# Patient Record
Sex: MALE | Race: WHITE | NOT HISPANIC OR LATINO | URBAN - METROPOLITAN AREA
[De-identification: names, ages, dates, MRNs, and addresses within clinical notes are randomized per-mention and may not be internally consistent; named-entity substitution may affect disease eponyms.]

---

## 2022-04-10 ENCOUNTER — EMERGENCY (EMERGENCY)
Facility: HOSPITAL | Age: 67
LOS: 1 days | Discharge: HOME | End: 2022-04-10
Attending: EMERGENCY MEDICINE
Payer: MEDICARE

## 2022-04-10 VITALS
DIASTOLIC BLOOD PRESSURE: 100 MMHG | HEART RATE: 45 BPM | SYSTOLIC BLOOD PRESSURE: 198 MMHG | RESPIRATION RATE: 20 BRPM | WEIGHT: 210.1 LBS | OXYGEN SATURATION: 100 % | HEIGHT: 73 IN | TEMPERATURE: 98 F

## 2022-04-10 VITALS
RESPIRATION RATE: 18 BRPM | OXYGEN SATURATION: 100 % | TEMPERATURE: 98 F | DIASTOLIC BLOOD PRESSURE: 68 MMHG | HEART RATE: 47 BPM | SYSTOLIC BLOOD PRESSURE: 114 MMHG

## 2022-04-10 LAB
ALBUMIN SERPL ELPH-MCNC: 4.2 G/DL — SIGNIFICANT CHANGE UP (ref 3.3–5)
ALP SERPL-CCNC: 81 U/L — SIGNIFICANT CHANGE UP (ref 40–120)
ALT FLD-CCNC: 15 U/L — SIGNIFICANT CHANGE UP (ref 10–45)
ANION GAP SERPL CALC-SCNC: 14 MMOL/L — SIGNIFICANT CHANGE UP (ref 5–17)
APPEARANCE UR: CLEAR — SIGNIFICANT CHANGE UP
AST SERPL-CCNC: 20 U/L — SIGNIFICANT CHANGE UP (ref 10–40)
BACTERIA # UR AUTO: NEGATIVE — SIGNIFICANT CHANGE UP
BASE EXCESS BLDV CALC-SCNC: 1.5 MMOL/L — SIGNIFICANT CHANGE UP (ref -2–2)
BASOPHILS # BLD AUTO: 0.03 K/UL — SIGNIFICANT CHANGE UP (ref 0–0.2)
BASOPHILS NFR BLD AUTO: 0.4 % — SIGNIFICANT CHANGE UP (ref 0–2)
BILIRUB SERPL-MCNC: 0.5 MG/DL — SIGNIFICANT CHANGE UP (ref 0.2–1.2)
BILIRUB UR-MCNC: NEGATIVE — SIGNIFICANT CHANGE UP
BUN SERPL-MCNC: 21 MG/DL — SIGNIFICANT CHANGE UP (ref 7–23)
CALCIUM SERPL-MCNC: 9.9 MG/DL — SIGNIFICANT CHANGE UP (ref 8.4–10.5)
CHLORIDE SERPL-SCNC: 107 MMOL/L — SIGNIFICANT CHANGE UP (ref 96–108)
CO2 BLDV-SCNC: 26 MMOL/L — SIGNIFICANT CHANGE UP (ref 22–26)
CO2 SERPL-SCNC: 22 MMOL/L — SIGNIFICANT CHANGE UP (ref 22–31)
COLOR SPEC: SIGNIFICANT CHANGE UP
CREAT SERPL-MCNC: 1.17 MG/DL — SIGNIFICANT CHANGE UP (ref 0.5–1.3)
DIFF PNL FLD: ABNORMAL
EGFR: 68 ML/MIN/1.73M2 — SIGNIFICANT CHANGE UP
EOSINOPHIL # BLD AUTO: 0.11 K/UL — SIGNIFICANT CHANGE UP (ref 0–0.5)
EOSINOPHIL NFR BLD AUTO: 1.5 % — SIGNIFICANT CHANGE UP (ref 0–6)
EPI CELLS # UR: 1 /HPF — SIGNIFICANT CHANGE UP
GAS PNL BLDV: SIGNIFICANT CHANGE UP
GLUCOSE SERPL-MCNC: 129 MG/DL — HIGH (ref 70–99)
GLUCOSE UR QL: NEGATIVE — SIGNIFICANT CHANGE UP
HCO3 BLDV-SCNC: 25 MMOL/L — SIGNIFICANT CHANGE UP (ref 22–29)
HCT VFR BLD CALC: 45.3 % — SIGNIFICANT CHANGE UP (ref 39–50)
HGB BLD-MCNC: 14.9 G/DL — SIGNIFICANT CHANGE UP (ref 13–17)
HYALINE CASTS # UR AUTO: 1 /LPF — SIGNIFICANT CHANGE UP (ref 0–2)
IMM GRANULOCYTES NFR BLD AUTO: 0.3 % — SIGNIFICANT CHANGE UP (ref 0–1.5)
KETONES UR-MCNC: NEGATIVE — SIGNIFICANT CHANGE UP
LEUKOCYTE ESTERASE UR-ACNC: NEGATIVE — SIGNIFICANT CHANGE UP
LIDOCAIN IGE QN: 20 U/L — SIGNIFICANT CHANGE UP (ref 7–60)
LYMPHOCYTES # BLD AUTO: 2.02 K/UL — SIGNIFICANT CHANGE UP (ref 1–3.3)
LYMPHOCYTES # BLD AUTO: 27.1 % — SIGNIFICANT CHANGE UP (ref 13–44)
MCHC RBC-ENTMCNC: 29.7 PG — SIGNIFICANT CHANGE UP (ref 27–34)
MCHC RBC-ENTMCNC: 32.9 GM/DL — SIGNIFICANT CHANGE UP (ref 32–36)
MCV RBC AUTO: 90.4 FL — SIGNIFICANT CHANGE UP (ref 80–100)
MONOCYTES # BLD AUTO: 0.53 K/UL — SIGNIFICANT CHANGE UP (ref 0–0.9)
MONOCYTES NFR BLD AUTO: 7.1 % — SIGNIFICANT CHANGE UP (ref 2–14)
NEUTROPHILS # BLD AUTO: 4.74 K/UL — SIGNIFICANT CHANGE UP (ref 1.8–7.4)
NEUTROPHILS NFR BLD AUTO: 63.6 % — SIGNIFICANT CHANGE UP (ref 43–77)
NITRITE UR-MCNC: NEGATIVE — SIGNIFICANT CHANGE UP
NRBC # BLD: 0 /100 WBCS — SIGNIFICANT CHANGE UP (ref 0–0)
PCO2 BLDV: 34 MMHG — LOW (ref 42–55)
PH BLDV: 7.47 — HIGH (ref 7.32–7.43)
PH UR: 6.5 — SIGNIFICANT CHANGE UP (ref 5–8)
PLATELET # BLD AUTO: 223 K/UL — SIGNIFICANT CHANGE UP (ref 150–400)
PO2 BLDV: 42 MMHG — SIGNIFICANT CHANGE UP (ref 25–45)
POTASSIUM SERPL-MCNC: 4 MMOL/L — SIGNIFICANT CHANGE UP (ref 3.5–5.3)
POTASSIUM SERPL-SCNC: 4 MMOL/L — SIGNIFICANT CHANGE UP (ref 3.5–5.3)
PROT SERPL-MCNC: 7.2 G/DL — SIGNIFICANT CHANGE UP (ref 6–8.3)
PROT UR-MCNC: NEGATIVE — SIGNIFICANT CHANGE UP
RBC # BLD: 5.01 M/UL — SIGNIFICANT CHANGE UP (ref 4.2–5.8)
RBC # FLD: 14.2 % — SIGNIFICANT CHANGE UP (ref 10.3–14.5)
RBC CASTS # UR COMP ASSIST: 193 /HPF — HIGH (ref 0–4)
SAO2 % BLDV: 76.3 % — SIGNIFICANT CHANGE UP (ref 67–88)
SODIUM SERPL-SCNC: 143 MMOL/L — SIGNIFICANT CHANGE UP (ref 135–145)
SP GR SPEC: 1.02 — SIGNIFICANT CHANGE UP (ref 1.01–1.02)
UROBILINOGEN FLD QL: NEGATIVE — SIGNIFICANT CHANGE UP
WBC # BLD: 7.45 K/UL — SIGNIFICANT CHANGE UP (ref 3.8–10.5)
WBC # FLD AUTO: 7.45 K/UL — SIGNIFICANT CHANGE UP (ref 3.8–10.5)
WBC UR QL: 1 /HPF — SIGNIFICANT CHANGE UP (ref 0–5)

## 2022-04-10 PROCEDURE — G1004: CPT

## 2022-04-10 PROCEDURE — 36415 COLL VENOUS BLD VENIPUNCTURE: CPT

## 2022-04-10 PROCEDURE — 93005 ELECTROCARDIOGRAM TRACING: CPT

## 2022-04-10 PROCEDURE — 74176 CT ABD & PELVIS W/O CONTRAST: CPT | Mod: 26,MG

## 2022-04-10 PROCEDURE — 85025 COMPLETE CBC W/AUTO DIFF WBC: CPT

## 2022-04-10 PROCEDURE — 99285 EMERGENCY DEPT VISIT HI MDM: CPT

## 2022-04-10 PROCEDURE — 81001 URINALYSIS AUTO W/SCOPE: CPT

## 2022-04-10 PROCEDURE — 87086 URINE CULTURE/COLONY COUNT: CPT

## 2022-04-10 PROCEDURE — 99285 EMERGENCY DEPT VISIT HI MDM: CPT | Mod: 25

## 2022-04-10 PROCEDURE — 74176 CT ABD & PELVIS W/O CONTRAST: CPT | Mod: MG

## 2022-04-10 PROCEDURE — 82803 BLOOD GASES ANY COMBINATION: CPT

## 2022-04-10 PROCEDURE — 80053 COMPREHEN METABOLIC PANEL: CPT

## 2022-04-10 PROCEDURE — 83690 ASSAY OF LIPASE: CPT

## 2022-04-10 PROCEDURE — 93010 ELECTROCARDIOGRAM REPORT: CPT

## 2022-04-10 PROCEDURE — 71045 X-RAY EXAM CHEST 1 VIEW: CPT | Mod: 26

## 2022-04-10 PROCEDURE — 71045 X-RAY EXAM CHEST 1 VIEW: CPT

## 2022-04-10 RX ORDER — ACETAMINOPHEN 500 MG
975 TABLET ORAL ONCE
Refills: 0 | Status: COMPLETED | OUTPATIENT
Start: 2022-04-10 | End: 2022-04-10

## 2022-04-10 RX ORDER — KETOROLAC TROMETHAMINE 30 MG/ML
15 SYRINGE (ML) INJECTION ONCE
Refills: 0 | Status: DISCONTINUED | OUTPATIENT
Start: 2022-04-10 | End: 2022-04-10

## 2022-04-10 RX ORDER — TAMSULOSIN HYDROCHLORIDE 0.4 MG/1
1 CAPSULE ORAL
Qty: 7 | Refills: 0
Start: 2022-04-10 | End: 2022-04-16

## 2022-04-10 RX ORDER — OXYCODONE HYDROCHLORIDE 5 MG/1
1 TABLET ORAL
Qty: 9 | Refills: 0
Start: 2022-04-10 | End: 2022-04-12

## 2022-04-10 RX ORDER — SODIUM CHLORIDE 9 MG/ML
1000 INJECTION INTRAMUSCULAR; INTRAVENOUS; SUBCUTANEOUS ONCE
Refills: 0 | Status: COMPLETED | OUTPATIENT
Start: 2022-04-10 | End: 2022-04-10

## 2022-04-10 RX ADMIN — Medication 975 MILLIGRAM(S): at 19:59

## 2022-04-10 RX ADMIN — SODIUM CHLORIDE 1000 MILLILITER(S): 9 INJECTION INTRAMUSCULAR; INTRAVENOUS; SUBCUTANEOUS at 20:00

## 2022-04-10 RX ADMIN — Medication 15 MILLIGRAM(S): at 19:59

## 2022-04-10 NOTE — ED PROVIDER NOTE - PATIENT PORTAL LINK FT
You can access the FollowMyHealth Patient Portal offered by [facility] by registering at the following website: [email or web address]. By joining [facility]’s FollowMyHealth portal, you will also be able to view your health information using other applications (apps) compatible with our system.

## 2022-04-10 NOTE — ED PROVIDER NOTE - ATTENDING CONTRIBUTION TO CARE
67M hx of PPM, kidney stones, colorectal CA sp chemo in remission here with 2 hours of colicky LLQ pain radiating to L flank which has resolved while waiting in ED. NO fevers, +hematuria, no dysuria, no skin rashes. Exam now unremarkable, minimal LLQ and L flank ttp. No skin rashes. Heart and lungs WNL. Probably recently passed kidney stone. Labs, UA, CTAP. 67M hx of PPM, kidney stones, colorectal CA sp chemo in remission here with 2 hours of colicky LLQ pain radiating to L flank which has mostly resolved while waiting in ED. NO fevers, +hematuria, no dysuria, no skin rashes. Exam now unremarkable, minimal LLQ and L flank ttp. No skin rashes. Heart and lungs WNL. Probably recently passed kidney stone. Labs, UA, CTAP. 67M hx of PPM, kidney stones, colorectal CA sp chemo in remission here with 2 hours of colicky LLQ pain radiating to L flank which has mostly resolved while waiting in ED. NO fevers, +hematuria, no dysuria, no skin rashes, no scrotal pain or swelling. Exam now unremarkable, minimal LLQ and L flank ttp. No skin rashes. Heart and lungs WNL. Probably kidney stone. Less likley vasc or GI. Labs, UA, CTAP.

## 2022-04-10 NOTE — ED PROVIDER NOTE - NSFOLLOWUPINSTRUCTIONS_ED_ALL_ED_FT
Kidney Stones    Kidney stones (urolithiasis) are crystal deposits that form inside your kidneys. Pain is caused by the stone moving through the urinary tract, causing spasms of the ureter. Drink enough water and fluids to keep your urine clear or pale yellow. This will help you to pass the stone or stone fragments. If provided a strainer, strain all urine and keep all particulate matter and stones for a follow up appointment with a urologist.    SEEK IMMEDIATE MEDICAL CARE IF YOU HAVE ANY OF THE FOLLOWING SYMPTOMS: pain not controlled with medication, fever/chills, worsening vomiting, inability to urinate, or dizziness/lightheadedness. Please follow up with a urologist within the week.     Kidney Stones    Kidney stones (urolithiasis) are crystal deposits that form inside your kidneys. Pain is caused by the stone moving through the urinary tract, causing spasms of the ureter. Drink enough water and fluids to keep your urine clear or pale yellow. This will help you to pass the stone or stone fragments. If provided a strainer, strain all urine and keep all particulate matter and stones for a follow up appointment with a urologist.    SEEK IMMEDIATE MEDICAL CARE IF YOU HAVE ANY OF THE FOLLOWING SYMPTOMS: pain not controlled with medication, fever/chills, worsening vomiting, inability to urinate, or dizziness/lightheadedness.

## 2022-04-10 NOTE — ED PROVIDER NOTE - PROGRESS NOTE DETAILS
Michael DIAZ: Pt is stable and ready for discharge. Strict return precautions given. All questions answered.

## 2022-04-10 NOTE — ED PROVIDER NOTE - NSFOLLOWUPCLINICS_GEN_ALL_ED_FT
[facility]  Urology  [address] [place] [address] - [address]rd [place] [address]  Phone: [phone]  Fax:   Follow Up Time: 4-6 Days

## 2022-04-10 NOTE — ED PROVIDER NOTE - NS ED ROS FT
CONSTITUTIONAL: No fever, weight loss, or fatigue  EYES: No eye pain, visual disturbances, or discharge  ENMT:  No difficulty hearing, tinnitus, vertigo; No sinus or throat pain  NECK: No pain or stiffness  RESPIRATORY: No cough, wheezing, chills or hemoptysis; No shortness of breath  CARDIOVASCULAR: No chest pain, palpitations, dizziness, or leg swelling  GASTROINTESTINAL: + abdominal and flank pain. No nausea, vomiting, or hematemesis; + diarrhea no constipation. No melena or hematochezia.  GENITOURINARY: No dysuria, frequency, +hematuria no incontinence  NEUROLOGICAL: No headaches, memory loss, loss of strength, numbness, or tremors  SKIN: No itching, burning, rashes, or lesions

## 2022-04-10 NOTE — ED PROVIDER NOTE - OBJECTIVE STATEMENT
68 y/o male w/ PMH HLD and colorectal cancer s/p chemo therapy w/ residual diarrhea c/o 2 hour history of intermittent cramping severe LLQ abd pain w/ radiation to the left flank. No pain meds used. Admits to hematuria yesterday. Denies fevers, chills, nausea, vomiting, dizziness, chest pain, SOB, dysuria. 68 y/o male w/ PMH cardiac problems requiring pacemaker in the future, kidney stones, HLD and colorectal cancer s/p chemo therapy w/ residual diarrhea c/o 2 hour history of intermittent cramping severe LLQ abd pain w/ radiation to the left flank. No pain meds used. Admits to hematuria yesterday. Denies fevers, chills, nausea, vomiting, dizziness, chest pain, SOB, dysuria.

## 2022-04-10 NOTE — ED ADULT NURSE NOTE - NSIMPLEMENTINTERV_GEN_ALL_ED
Implemented All Universal Safety Interventions:  [place] to call system. Call bell, personal items and telephone within reach. Instruct patient to call for assistance. Room bathroom lighting operational. Non-slip footwear when patient is off stretcher. Physically safe environment: no spills, clutter or unnecessary equipment. Stretcher in lowest position, wheels locked, appropriate side rails in place.

## 2022-04-10 NOTE — ED PROVIDER NOTE - CLINICAL SUMMARY MEDICAL DECISION MAKING FREE TEXT BOX
68 y/o male w/ PMH HLD and colorectal cancer s/p chemo therapy w/ residual diarrhea c/o 2 hour history of intermittent cramping severe LLQ abd pain w/ radiation to the left flank. Pt colicky and is not in pain during examination. Likely urolithiasis. Low concern for intraabdominal pathology (pt has benign abd exam w/ no current pain). Will treat patient's pain, draw labwork, and reassess. Likely d/c

## 2022-04-10 NOTE — ED PROVIDER NOTE - PHYSICAL EXAMINATION
GENERAL: well appearing in no acute distress, non-toxic appearing  HEAD: normocephalic, atraumatic  HEENT: normal conjunctiva, oral mucosa moist, uvula midline, no tonsilar exudates, neck supple, no JVD  CARDIAC: regular rate and rhythm, normal S1S2, no appreciable murmurs, 2+ pulses in UE  PULM: normal breath sounds, clear to ascultation bilaterally, no rales, rhonchi, wheezing  GI: abdomen nondistended, soft, nontender, no guarding, rebound tenderness  : +L- CVA tenderness no suprapubic tenderness  NEURO: no focal motor or sensory deficits, CN2-12 intact, normal speech, PERRLA, EOMI, AAOx3  MSK: no peripheral edema, no calf tenderness b/l  SKIN: well-perfused, extremities warm, no visible rashes  PSYCH: appropriate mood and affect

## 2022-04-10 NOTE — ED ADULT NURSE NOTE - OBJECTIVE STATEMENT
Pt is a 67 yr old male with pmh of HLD, and colorectal CA s/p chemo/radiation (in remission now- no rectum but no ostomy) coming from home for left sided abd pain. Pt states last night he had some blood in his urine- and today woke up with intense left sided flank pain radiating into his left lower groin. Pt denies blood in his urine today- but the pain comes in waves and is a 8/10  in intensity. Pt had a kidney stone some 25 years ago- and they had to go in and remove it through his penis. Pt is a/ox 3- vitals stable.

## 2022-04-11 LAB
CULTURE RESULTS: SIGNIFICANT CHANGE UP
SPECIMEN SOURCE: SIGNIFICANT CHANGE UP

## 2022-10-18 PROBLEM — Z00.00 ENCOUNTER FOR PREVENTIVE HEALTH EXAMINATION: Status: ACTIVE | Noted: 2022-10-18

## 2022-10-19 ENCOUNTER — APPOINTMENT (OUTPATIENT)
Dept: GASTROENTEROLOGY | Facility: CLINIC | Age: 67
End: 2022-10-19

## 2022-10-19 VITALS
TEMPERATURE: 97.3 F | WEIGHT: 206 LBS | HEIGHT: 72 IN | RESPIRATION RATE: 12 BRPM | SYSTOLIC BLOOD PRESSURE: 150 MMHG | DIASTOLIC BLOOD PRESSURE: 80 MMHG | OXYGEN SATURATION: 98 % | HEART RATE: 56 BPM | BODY MASS INDEX: 27.9 KG/M2

## 2022-10-19 DIAGNOSIS — Z78.9 OTHER SPECIFIED HEALTH STATUS: ICD-10-CM

## 2022-10-19 PROCEDURE — 99203 OFFICE O/P NEW LOW 30 MIN: CPT

## 2022-10-19 RX ORDER — METRONIDAZOLE 500 MG/1
500 TABLET ORAL
Qty: 30 | Refills: 0 | Status: ACTIVE | COMMUNITY
Start: 2022-09-22

## 2022-10-19 RX ORDER — LOPERAMIDE HYDROCHLORIDE 2 MG/1
2 CAPSULE ORAL
Qty: 180 | Refills: 0 | Status: ACTIVE | COMMUNITY
Start: 2022-06-27

## 2022-10-19 NOTE — ASSESSMENT
[FreeTextEntry1] : 66 yo male, history of stage 3  rectal cancer 2003, s/p resection at [facility]. He underwent sgy and RT /chemoat that time and since then he has as 20-30 x soft bms, not diarrhea. He is under the care of Mustapha Barkley MD and has used Loperamide, lomotil, cholestyramine with some degree of control .He states his last colonoscopy was one year ago with Dr. Barkley (will request);  He has 3 adult children who have not undergone colonoscopy, despite his hx, father hx and grandfather all with colon cancer. He does not recall any genetic testing being performed. He states stool tests with Dr. Barkley were " normal."\par \par Imp:\par likely urge incontinence, frequent soft bms due to short colon, decreased rectal capacity\par \par Plan:\par 1. Request records from Dr. Barkley\par 2. Maintain current treatment\par 3. 4 week followup to review.

## 2022-10-19 NOTE — PHYSICAL EXAM

## 2022-10-19 NOTE — HISTORY OF PRESENT ILLNESS
[FreeTextEntry1] : 66 yo male, history of stage 3  rectal cancer 2003, s/p resection at [facility]. He underwent sgy and RT /chemoat that time and since then he has as 20-30 x soft bms, not diarrhea. He is under the care of Mustapha Barkley MD and has used Loperamide, lomotil, cholestyramine with some degree of control .He states his last colonoscopy was one year ago with Dr. Barkley (will request);  He has 3 adult children who have not undergone colonoscopy, despite his hx, father hx and grandfather all with colon cancer. He does not recall any genetic testing being performed. He states stool tests with Dr. Barkley were " normal."

## 2022-10-19 NOTE — REVIEW OF SYSTEMS
[Diarrhea] : diarrhea [Negative] : Heme/Lymph [As Noted in HPI] : as noted in HPI [FreeTextEntry7] : frequent soft bms

## 2022-11-17 ENCOUNTER — APPOINTMENT (OUTPATIENT)
Dept: GASTROENTEROLOGY | Facility: CLINIC | Age: 67
End: 2022-11-17

## 2022-11-30 ENCOUNTER — APPOINTMENT (OUTPATIENT)
Dept: GASTROENTEROLOGY | Facility: CLINIC | Age: 67
End: 2022-11-30

## 2022-11-30 VITALS
BODY MASS INDEX: 27.77 KG/M2 | TEMPERATURE: 98 F | OXYGEN SATURATION: 99 % | DIASTOLIC BLOOD PRESSURE: 78 MMHG | RESPIRATION RATE: 12 BRPM | SYSTOLIC BLOOD PRESSURE: 130 MMHG | HEART RATE: 51 BPM | WEIGHT: 205 LBS | HEIGHT: 72 IN

## 2022-11-30 DIAGNOSIS — R19.8 OTHER SPECIFIED SYMPTOMS AND SIGNS INVOLVING THE DIGESTIVE SYSTEM AND ABDOMEN: ICD-10-CM

## 2022-11-30 PROCEDURE — 99214 OFFICE O/P EST MOD 30 MIN: CPT

## 2022-11-30 RX ORDER — SODIUM SULFATE, POTASSIUM SULFATE AND MAGNESIUM SULFATE 1.6; 3.13; 17.5 G/177ML; G/177ML; G/177ML
17.5-3.13-1.6 SOLUTION ORAL
Qty: 354 | Refills: 0 | Status: ACTIVE | COMMUNITY
Start: 2022-11-30 | End: 1900-01-01

## 2022-11-30 NOTE — ASSESSMENT
[FreeTextEntry1] : 66 yo male, history of stage 3  rectal cancer 2003, s/p resection at [facility]. He underwent sgy and RT /chemoat that time and since then he has as 20-30 x soft bms, not diarrhea. He is under the care of Mustapha Barkley MD and has used Loperamide, lomotil, cholestyramine with some degree of control .He states his last colonoscopy was one year ago with Dr. Barkley (will request);  He has 3 adult children who have not undergone colonoscopy, despite his hx, father hx and grandfather all with colon cancer. He does not recall any genetic testing being performed. He states stool tests with Dr. Barkley were " normal."\par \par 11/30/22- Returns in followup today. Reviewed previous colonoscopy March 2021 with radiation proctiits, and dim polyp.\par likely urge incontinence, frequent soft bms due to short colon, decreased rectal capacity\par \par Plan:\par 1. advised repeat colonoscopy with biopsies throughout colon\par 2 . EUS rectum after above, MRI pelvis\par \par He was advised to undergo colonoscopy to which he  agreed. The procedure will be performed in [facility] with the assistance of an anesthesiologist. The procedure was explained in detail and he understood the risks of the procedure not limited  to infection, bleeding, perforation, risk of anesthesia and risk of IV site problems,emergency surgery, missed lesions  or non-diagnosis of colon cancer. He  was advised that he could not drive home alone, if the patient chooses to receive sedation. Further diagnostic and treatment recommendations will be based upon the procedure and any biopsies, if they are taken.\par

## 2022-11-30 NOTE — PHYSICAL EXAM

## 2022-11-30 NOTE — REVIEW OF SYSTEMS
[As Noted in HPI] : as noted in HPI [Diarrhea] : diarrhea [Negative] : Heme/Lymph [FreeTextEntry7] : frequent soft bms

## 2023-03-21 RX ORDER — POLYETHYLENE GLYCOL-3350 AND ELECTROLYTES WITH FLAVOR PACK 240; 5.84; 2.98; 6.72; 22.72 G/278.26G; G/278.26G; G/278.26G; G/278.26G; G/278.26G
240 POWDER, FOR SOLUTION ORAL
Qty: 4000 | Refills: 0 | Status: ACTIVE | COMMUNITY
Start: 2023-03-21 | End: 1900-01-01

## 2023-03-26 ENCOUNTER — RESULT REVIEW (OUTPATIENT)
Age: 68
End: 2023-03-26

## 2023-03-27 ENCOUNTER — APPOINTMENT (OUTPATIENT)
Dept: GASTROENTEROLOGY | Facility: AMBULATORY SURGERY CENTER | Age: 68
End: 2023-03-27
Payer: MEDICARE

## 2023-03-27 PROCEDURE — 45380 COLONOSCOPY AND BIOPSY: CPT

## 2023-03-30 RX ORDER — COLESTIPOL HYDROCHLORIDE 1 G/1
1 TABLET, FILM COATED ORAL
Qty: 60 | Refills: 0 | Status: ACTIVE | COMMUNITY
Start: 2023-03-30 | End: 1900-01-01

## 2023-05-25 ENCOUNTER — APPOINTMENT (OUTPATIENT)
Dept: GASTROENTEROLOGY | Facility: CLINIC | Age: 68
End: 2023-05-25
Payer: MEDICARE

## 2023-05-25 VITALS
TEMPERATURE: 97.8 F | OXYGEN SATURATION: 98 % | HEART RATE: 60 BPM | WEIGHT: 207 LBS | DIASTOLIC BLOOD PRESSURE: 74 MMHG | SYSTOLIC BLOOD PRESSURE: 128 MMHG | RESPIRATION RATE: 12 BRPM | BODY MASS INDEX: 28.04 KG/M2 | HEIGHT: 72 IN

## 2023-05-25 DIAGNOSIS — K52.9 NONINFECTIVE GASTROENTERITIS AND COLITIS, UNSPECIFIED: ICD-10-CM

## 2023-05-25 DIAGNOSIS — K58.0 IRRITABLE BOWEL SYNDROME WITH DIARRHEA: ICD-10-CM

## 2023-05-25 DIAGNOSIS — R15.9 FULL INCONTINENCE OF FECES: ICD-10-CM

## 2023-05-25 PROCEDURE — 99214 OFFICE O/P EST MOD 30 MIN: CPT

## 2023-05-25 RX ORDER — ELUXADOLINE 100 MG/1
100 TABLET, FILM COATED ORAL TWICE DAILY
Qty: 60 | Refills: 1 | Status: ACTIVE | COMMUNITY
Start: 2023-05-25 | End: 1900-01-01

## 2023-05-25 NOTE — PHYSICAL EXAM
[Alert] : alert [Normal Voice/Communication] : normal voice/communication [Healthy Appearing] : healthy appearing [No Acute Distress] : no acute distress [Sclera] : the sclera and conjunctiva were normal [Hearing Threshold Finger Rub Not [place]] : hearing was normal [Normal Lips/Gums] : the lips and gums were normal [Oropharynx] : the oropharynx was normal [Normal Appearance] : the appearance of the neck was normal [No Neck Mass] : no neck mass was observed [No Respiratory Distress] : no respiratory distress [No Acc Muscle Use] : no accessory muscle use [Respiration, Rhythm And Depth] : normal respiratory rhythm and effort [Auscultation Breath Sounds / Voice Sounds] : lungs were clear to auscultation bilaterally [Heart Rate And Rhythm] : heart rate was normal and rhythm regular [Normal S1, S2] : normal S1 and S2 [Murmurs] : no murmurs [Bowel Sounds] : normal bowel sounds [Abdomen Tenderness] : non-tender [No Masses] : no abdominal mass palpated [Abdomen Soft] : soft [] : no hepatosplenomegaly [Oriented To Time, Place, And Person] : oriented to person, place, and time

## 2023-05-25 NOTE — ASSESSMENT
[FreeTextEntry1] : 68 yo male, history of stage 3  rectal cancer 2003, s/p resection at [facility]. He underwent sgy and RT /chemoat that time and since then he has as 20-30 x soft bms, not diarrhea. He is under the care of Mustapha Barkley MD and has used Loperamide, lomotil, cholestyramine with some degree of control .He states his last colonoscopy was one year ago with Dr. Barkley (will request);  He has 3 adult children who have not undergone colonoscopy, despite his hx, father hx and grandfather all with colon cancer. He does not recall any genetic testing being performed. He states stool tests with Dr. Barkley were " normal."\par \par 11/30/22- Returns in followup today. Reviewed previous colonoscopy March 2021 with radiation proctiits, and dim polyp.\par \par 05/25/23- Pt returns in followup with wife. S/p colonoscopy with repeat bx without colitis, microscopic or otherwise. Has greater than 10 small soft bms daily, not while exercising, but interferes with sleep and /or daily activities.No continuous help with imodium. Wife states that only tincture of opium helped pt post op more than 20 years ago. We discussed possiblity to try Viberzi.\par \par IMP:\par IBS-D\par hx of rectal cancer s/p APR\par \par PLAN:\par 1. viberzi bid\par 2. RTO 3months\par 3. If no help, EUS\par

## 2023-06-05 RX ORDER — RIFAXIMIN 550 MG/1
550 TABLET ORAL
Qty: 42 | Refills: 2 | Status: ACTIVE | OUTPATIENT
Start: 2023-05-31

## 2024-01-22 ENCOUNTER — INPATIENT (INPATIENT)
Facility: HOSPITAL | Age: 69
LOS: 1 days | Discharge: HOME | End: 2024-01-24
Attending: INTERNAL MEDICINE | Admitting: INTERNAL MEDICINE
Payer: MEDICARE

## 2024-01-22 VITALS
HEART RATE: 63 BPM | TEMPERATURE: 98 F | OXYGEN SATURATION: 95 % | DIASTOLIC BLOOD PRESSURE: 71 MMHG | SYSTOLIC BLOOD PRESSURE: 119 MMHG | RESPIRATION RATE: 17 BRPM

## 2024-01-22 DIAGNOSIS — R19.7 DIARRHEA, UNSPECIFIED: ICD-10-CM

## 2024-01-22 LAB
ALBUMIN SERPL ELPH-MCNC: 3.6 G/DL — SIGNIFICANT CHANGE UP (ref 3.3–5)
ALP SERPL-CCNC: 65 U/L — SIGNIFICANT CHANGE UP (ref 40–120)
ALT FLD-CCNC: 25 U/L — SIGNIFICANT CHANGE UP (ref 4–41)
ANION GAP SERPL CALC-SCNC: 11 MMOL/L — SIGNIFICANT CHANGE UP (ref 7–14)
APPEARANCE UR: CLEAR — SIGNIFICANT CHANGE UP
AST SERPL-CCNC: 22 U/L — SIGNIFICANT CHANGE UP (ref 4–40)
BASOPHILS # BLD AUTO: 0.03 K/UL — SIGNIFICANT CHANGE UP (ref 0–0.2)
BASOPHILS NFR BLD AUTO: 0.4 % — SIGNIFICANT CHANGE UP (ref 0–2)
BILIRUB SERPL-MCNC: 0.7 MG/DL — SIGNIFICANT CHANGE UP (ref 0.2–1.2)
BILIRUB UR-MCNC: NEGATIVE — SIGNIFICANT CHANGE UP
BUN SERPL-MCNC: 15 MG/DL — SIGNIFICANT CHANGE UP (ref 7–23)
CALCIUM SERPL-MCNC: 9 MG/DL — SIGNIFICANT CHANGE UP (ref 8.4–10.5)
CHLORIDE SERPL-SCNC: 108 MMOL/L — HIGH (ref 98–107)
CO2 SERPL-SCNC: 22 MMOL/L — SIGNIFICANT CHANGE UP (ref 22–31)
COLOR SPEC: YELLOW — SIGNIFICANT CHANGE UP
CREAT SERPL-MCNC: 0.98 MG/DL — SIGNIFICANT CHANGE UP (ref 0.5–1.3)
DIFF PNL FLD: NEGATIVE — SIGNIFICANT CHANGE UP
EGFR: 83 ML/MIN/1.73M2 — SIGNIFICANT CHANGE UP
EOSINOPHIL # BLD AUTO: 0.15 K/UL — SIGNIFICANT CHANGE UP (ref 0–0.5)
EOSINOPHIL NFR BLD AUTO: 2.2 % — SIGNIFICANT CHANGE UP (ref 0–6)
GLUCOSE SERPL-MCNC: 102 MG/DL — HIGH (ref 70–99)
GLUCOSE UR QL: NEGATIVE MG/DL — SIGNIFICANT CHANGE UP
HCT VFR BLD CALC: 41.1 % — SIGNIFICANT CHANGE UP (ref 39–50)
HGB BLD-MCNC: 13.9 G/DL — SIGNIFICANT CHANGE UP (ref 13–17)
IANC: 4.1 K/UL — SIGNIFICANT CHANGE UP (ref 1.8–7.4)
IMM GRANULOCYTES NFR BLD AUTO: 0.4 % — SIGNIFICANT CHANGE UP (ref 0–0.9)
KETONES UR-MCNC: NEGATIVE MG/DL — SIGNIFICANT CHANGE UP
LACTATE SERPL-SCNC: 0.7 MMOL/L — SIGNIFICANT CHANGE UP (ref 0.5–2)
LEUKOCYTE ESTERASE UR-ACNC: NEGATIVE — SIGNIFICANT CHANGE UP
LIDOCAIN IGE QN: 27 U/L — SIGNIFICANT CHANGE UP (ref 7–60)
LYMPHOCYTES # BLD AUTO: 1.96 K/UL — SIGNIFICANT CHANGE UP (ref 1–3.3)
LYMPHOCYTES # BLD AUTO: 28.4 % — SIGNIFICANT CHANGE UP (ref 13–44)
MAGNESIUM SERPL-MCNC: 1.9 MG/DL — SIGNIFICANT CHANGE UP (ref 1.6–2.6)
MCHC RBC-ENTMCNC: 30.2 PG — SIGNIFICANT CHANGE UP (ref 27–34)
MCHC RBC-ENTMCNC: 33.8 GM/DL — SIGNIFICANT CHANGE UP (ref 32–36)
MCV RBC AUTO: 89.3 FL — SIGNIFICANT CHANGE UP (ref 80–100)
MONOCYTES # BLD AUTO: 0.63 K/UL — SIGNIFICANT CHANGE UP (ref 0–0.9)
MONOCYTES NFR BLD AUTO: 9.1 % — SIGNIFICANT CHANGE UP (ref 2–14)
NEUTROPHILS # BLD AUTO: 4.1 K/UL — SIGNIFICANT CHANGE UP (ref 1.8–7.4)
NEUTROPHILS NFR BLD AUTO: 59.5 % — SIGNIFICANT CHANGE UP (ref 43–77)
NITRITE UR-MCNC: NEGATIVE — SIGNIFICANT CHANGE UP
NRBC # BLD: 0 /100 WBCS — SIGNIFICANT CHANGE UP (ref 0–0)
NRBC # FLD: 0 K/UL — SIGNIFICANT CHANGE UP (ref 0–0)
PH UR: 5.5 — SIGNIFICANT CHANGE UP (ref 5–8)
PLATELET # BLD AUTO: 178 K/UL — SIGNIFICANT CHANGE UP (ref 150–400)
POTASSIUM SERPL-MCNC: 3.5 MMOL/L — SIGNIFICANT CHANGE UP (ref 3.5–5.3)
POTASSIUM SERPL-SCNC: 3.5 MMOL/L — SIGNIFICANT CHANGE UP (ref 3.5–5.3)
PROT SERPL-MCNC: 6.7 G/DL — SIGNIFICANT CHANGE UP (ref 6–8.3)
PROT UR-MCNC: NEGATIVE MG/DL — SIGNIFICANT CHANGE UP
RBC # BLD: 4.6 M/UL — SIGNIFICANT CHANGE UP (ref 4.2–5.8)
RBC # FLD: 14 % — SIGNIFICANT CHANGE UP (ref 10.3–14.5)
SODIUM SERPL-SCNC: 141 MMOL/L — SIGNIFICANT CHANGE UP (ref 135–145)
SP GR SPEC: 1.02 — SIGNIFICANT CHANGE UP (ref 1–1.03)
UROBILINOGEN FLD QL: 0.2 MG/DL — SIGNIFICANT CHANGE UP (ref 0.2–1)
WBC # BLD: 6.9 K/UL — SIGNIFICANT CHANGE UP (ref 3.8–10.5)
WBC # FLD AUTO: 6.9 K/UL — SIGNIFICANT CHANGE UP (ref 3.8–10.5)

## 2024-01-22 PROCEDURE — 74177 CT ABD & PELVIS W/CONTRAST: CPT | Mod: 26,MA

## 2024-01-22 PROCEDURE — 99285 EMERGENCY DEPT VISIT HI MDM: CPT

## 2024-01-22 PROCEDURE — 99223 1ST HOSP IP/OBS HIGH 75: CPT

## 2024-01-22 RX ORDER — SODIUM CHLORIDE 9 MG/ML
1000 INJECTION INTRAMUSCULAR; INTRAVENOUS; SUBCUTANEOUS ONCE
Refills: 0 | Status: COMPLETED | OUTPATIENT
Start: 2024-01-22 | End: 2024-01-22

## 2024-01-22 RX ADMIN — SODIUM CHLORIDE 1000 MILLILITER(S): 9 INJECTION INTRAMUSCULAR; INTRAVENOUS; SUBCUTANEOUS at 16:07

## 2024-01-22 NOTE — H&P ADULT - TIME BILLING
Reviewed admission imaging reports, and admission labs prior to the encounter with  the patient   Reviewed Triage and ED course/ documentation   Performed full physical exam and ROS  Obtained list of home medications and performed home medications reconciliation on EMR  Discussed prognosis, treatment, its risk and benefits and potential endoscopic GI w/up with the patient  Ordered or reviewed new admission medications and placed or reviewed all hospitalization admission orders  Managed (continued, held or adjusted doses) home medications   Ordered  or reviewed orders of  new imaging and new lab w/up  Requested new consultations including::: GI for colonoscopy

## 2024-01-22 NOTE — H&P ADULT - NSHPPHYSICALEXAM_GEN_ALL_CORE
Vital Signs Last 24 Hrs  T(C): 36.8 (22 Jan 2024 22:58), Max: 36.8 (22 Jan 2024 11:52)  T(F): 98.2 (22 Jan 2024 22:58), Max: 98.2 (22 Jan 2024 11:52)  HR: 50 (22 Jan 2024 22:58) (50 - 63)  BP: 125/95 (22 Jan 2024 22:58) (119/71 - 125/95)  BP(mean): --  RR: 16 (22 Jan 2024 22:58) (16 - 17)  SpO2: 96% (22 Jan 2024 22:58) (95% - 96%)    Parameters below as of 22 Jan 2024 22:58  Patient On (Oxygen Delivery Method): room air

## 2024-01-22 NOTE — H&P ADULT - NSICDXFAMILYHX_GEN_ALL_CORE_FT
FAMILY HISTORY:  Father  Still living? Unknown  Family history of pacemaker, Age at diagnosis: Age Unknown    Mother  Still living? Unknown  Family history of pacemaker, Age at diagnosis: Age Unknown

## 2024-01-22 NOTE — ED ADULT TRIAGE NOTE - CHIEF COMPLAINT QUOTE
Pt presenting for several episodes of diarrhea starting 3 weeks ago worsening after starting antibiotic 3 days ago. Pt has a Phx of colorectal CA, in remission. Pt reports large amount of bright red blood in stool this morning. Pt reports over 60 episodes of diarrhea and 16lb weight loss since yesterday.

## 2024-01-22 NOTE — ED PROVIDER NOTE - ATTENDING CONTRIBUTION TO CARE
69-year-old male with past medical history of colon cancer in remission coming in with 3 to 4 weeks of diarrhea that has gotten worse over the past 3 days.  Patient reports he was seen by his gastroenterologist and started on metronidazole and 10 mg of prednisone daily about 5 days ago.  Patient reports symptoms improved for 2 days and now resumed but worse.  Reports he has had about 50-60 episodes since he woke up this morning.  Reports this morning he also had 1 bloody bowel movement.  States he has had numerous episodes after that were nonbloody.  No fevers, chills, chest pain, shortness of breath.  Reports that he has lost about 20 pounds over the course of this.  Reports that he is able to eat and drink unable to tolerate food.  Gets intermittent abdominal cramping when he has to have a bowel movement otherwise no abdominal pain.  Last colonoscopy was a year ago.  Patient is well-appearing.  No distress.  Abdomen is soft nontender.  Differential diagnoses include but not limited to colitis, recurrence of colon cancer, diverticulitis, other intra-abdominal pathologies.  Patient reports stool is a mix of watery stool in the form stool–no concern for C. difficile.

## 2024-01-22 NOTE — H&P ADULT - CONSTITUTIONAL
New patient coming in on 08/27 for dyspnea/chest discomfort/ref Dr. Gentile [phone]  
well-groomed/no distress

## 2024-01-22 NOTE — H&P ADULT - PROBLEM SELECTOR PLAN 1
Severe diarrhea with bloody episodes up to 50-60 times per day;   CT A. Rectosigmoid anastomosis again noted. Moderate amount of stool in the colon. No bowel wall thickening.  r/o infectious etiology     -Holding Prednisone, Loperamide and Diphenoxylate-atropine pending infectious w/up  -f/u C diff PCR, GI PCR, Stool Cx  -f/u Ova and Parasites   -f/u TSH, ESR, CRP  -Holding Abx   -Formal GI c/s requested for possible colonoscopy   -Diet: clears for now pending GI c/s - this was d/w the pt  -IV hydration: LR  -Supp empirically potassium   -Monitor lytes and renal function daily

## 2024-01-22 NOTE — H&P ADULT - HISTORY OF PRESENT ILLNESS
68yo male with past medical history of colon cancer in remission coming in with 3 to 4 weeks of diarrhea that has gotten worse over the past 3 days.  Patient reports he was seen by his gastroenterologist and started on metronidazole and 10 mg of prednisone daily about 5 days ago.  Patient reports symptoms improved for 2 days and now resumed but worse.  Reports he has had about 50-60 episodes since he woke up this morning.  Reports this morning he also had 1 bloody bowel movement.  States he has had numerous episodes after that were nonbloody.  Reports no fever, chills, chest pain, shortness of breath.  Reports that he has lost about 20 pounds over the course of this.  Reports that he is able to eat and drink unable to tolerate food.  Gets intermittent abdominal cramping when he has to have a bowel movement otherwise no abdominal pain.  Last colonoscopy was a year ago.    68yo male with past medical history of colon cancer (20 years ago) in remission and chronic diarrhea, BPH ; Pt coming in with 3 to 4 weeks of diarrhea that has gotten worse over the past 3 days.  Patient reports he was seen by his gastroenterologist and started on metronidazole 500mg TID and 10 mg of prednisone daily about 5 days ago.  Patient reports symptoms improved for 2 days and now resumed but much worse. Reports he has had about 50-60 episodes since he woke up this morning. Reports this morning he also had 1 bloody bowel movement.  States he has had numerous episodes after that were nonbloody.  Reports no fever, chills, chest pain, shortness of breath.  Reports that he has lost about 20 pounds over the course of this.  Reports that he is able to eat and drink unable to tolerate food.  Gets intermittent abdominal cramping when he has to have a bowel movement otherwise no abdominal pain.  Last colonoscopy was a year ago.

## 2024-01-22 NOTE — ED ADULT NURSE NOTE - CHIEF COMPLAINT
The patient is a 69y Male complaining of diarrhea. Pt states that he has been having diarrhea for 3 weeks. Pt has hx of colon CA and started taking a new antibiotic 3 days ago. Pt states that he lost 15lbs overnight.

## 2024-01-22 NOTE — H&P ADULT - ASSESSMENT
70yo male with past medical history of colon cancer (20 years ago) in remission and chronic diarrhea, BPH a/w severe bloody diarrhea due to likely acute on chronic radiation colitis r/o infectious etiology;

## 2024-01-22 NOTE — H&P ADULT - NSHPLABSRESULTS_GEN_ALL_CORE
.    -Personally INTERPRETED EKG:       -Personally Reviewed the below labs:    Urinalysis Basic - ( 2024 17:31 )  Color: Yellow / Appearance: Clear / S.018 / pH: x  Gluc: x / Ketone: Negative mg/dL  / Bili: Negative / Urobili: 0.2 mg/dL   Blood: x / Protein: Negative mg/dL / Nitrite: Negative   Leuk Esterase: Negative / RBC: x / WBC x   Sq Epi: x / Non Sq Epi: x / Bacteria: x                        13.9   6.90  )-----------( 178      ( 2024 16:00 )             41.1       141  |  108<H>  |  15  ----------------------------<  102<H>  3.5   |  22  |  0.98    Ca    9.0      2024 16:00  Mg     1.90         TPro  6.7  /  Alb  3.6  /  TBili  0.7  /  DBili  x   /  AST    /  ALT  25  /  AlkPhos  65          -Personally Reviewed: CT ABDOMEN AND PELVIS IC   ORDERED BY: ROHAN ACKERMAN     PROCEDURE DATE:  2024    FINDINGS:  LOWER CHEST: Moderate size hiatal hernia containing gastric fundus. Mild   linear atelectasis.  LIVER: Subcentimeter hypodense focus segment 8, too small to characterize.  BILE DUCTS: Normal caliber.  GALLBLADDER: Within normal limits.  SPLEEN: Within normal limits.  PANCREAS: Within normal limits.  ADRENALS: Within normal limits.  KIDNEYS/URETERS: 2 cm right renal cyst. 2 mm non obstructive left kidney   stone.  BLADDER: Within normal limits.  REPRODUCTIVE ORGANS: obscured by artifact.  BOWEL: Small duodenal diverticulum. No bowel obstruction. Appendix is not   visualized. Rectosigmoid anastomosis again noted. Moderate amount of   stool in the colon. No bowel wall thickening.  PERITONEUM: No ascites.  VESSELS: Calcific atherosclerotic disease of the abdominal aorta.   Infrarenal segment borderline ectatic measuring 2.8 cm. Right common   iliac artery 1.8 cm. Left common iliac artery 1.8 cm.  RETROPERITONEUM/LYMPH NODES: No lymphadenopathy.  ABDOMINAL WALL: 7 cm subcutaneous fluid collection overlying the left   greater trochanter femur.  BONES: Right hip arthroplasty. Degenerative changes of the spine.  IMPRESSION:  Rectosigmoid anastomosis.  No acute abnormality identified.  7 cm subcutaneous fluid collection left hip, likely old an hematoma. .    -Personally INTERPRETED EKG: sinus bradycardia, 56bpm, qtc 445, RBBB, no acute Tw or ST changes, no PACs or PVCs      -Personally Reviewed the below labs:    Urinalysis Basic - ( 2024 17:31 )  Color: Yellow / Appearance: Clear / S.018 / pH: x  Gluc: x / Ketone: Negative mg/dL  / Bili: Negative / Urobili: 0.2 mg/dL   Blood: x / Protein: Negative mg/dL / Nitrite: Negative   Leuk Esterase: Negative / RBC: x / WBC x   Sq Epi: x / Non Sq Epi: x / Bacteria: x                        13.9   6.90  )-----------( 178      ( 2024 16:00 )             41.1       141  |  108<H>  |  15  ----------------------------<  102<H>  3.5   |  22  |  0.98    Ca    9.0      2024 16:00  Mg     1.90         TPro  6.7  /  Alb  3.6  /  TBili  0.7  /  DBili  x   /  AST    /  ALT  25  /  AlkPhos  65          -Personally Reviewed: CT ABDOMEN AND PELVIS IC   ORDERED BY: ROHAN ACKERMAN     PROCEDURE DATE:  2024    FINDINGS:  LOWER CHEST: Moderate size hiatal hernia containing gastric fundus. Mild   linear atelectasis.  LIVER: Subcentimeter hypodense focus segment 8, too small to characterize.  BILE DUCTS: Normal caliber.  GALLBLADDER: Within normal limits.  SPLEEN: Within normal limits.  PANCREAS: Within normal limits.  ADRENALS: Within normal limits.  KIDNEYS/URETERS: 2 cm right renal cyst. 2 mm non obstructive left kidney   stone.  BLADDER: Within normal limits.  REPRODUCTIVE ORGANS: obscured by artifact.  BOWEL: Small duodenal diverticulum. No bowel obstruction. Appendix is not   visualized. Rectosigmoid anastomosis again noted. Moderate amount of   stool in the colon. No bowel wall thickening.  PERITONEUM: No ascites.  VESSELS: Calcific atherosclerotic disease of the abdominal aorta.   Infrarenal segment borderline ectatic measuring 2.8 cm. Right common   iliac artery 1.8 cm. Left common iliac artery 1.8 cm.  RETROPERITONEUM/LYMPH NODES: No lymphadenopathy.  ABDOMINAL WALL: 7 cm subcutaneous fluid collection overlying the left   greater trochanter femur.  BONES: Right hip arthroplasty. Degenerative changes of the spine.  IMPRESSION:  Rectosigmoid anastomosis.  No acute abnormality identified.  7 cm subcutaneous fluid collection left hip, likely old an hematoma.

## 2024-01-23 DIAGNOSIS — K52.0 GASTROENTERITIS AND COLITIS DUE TO RADIATION: ICD-10-CM

## 2024-01-23 DIAGNOSIS — N40.0 BENIGN PROSTATIC HYPERPLASIA WITHOUT LOWER URINARY TRACT SYMPTOMS: ICD-10-CM

## 2024-01-23 DIAGNOSIS — C18.9 MALIGNANT NEOPLASM OF COLON, UNSPECIFIED: Chronic | ICD-10-CM

## 2024-01-23 DIAGNOSIS — Z29.9 ENCOUNTER FOR PROPHYLACTIC MEASURES, UNSPECIFIED: ICD-10-CM

## 2024-01-23 LAB
ANION GAP SERPL CALC-SCNC: 10 MMOL/L — SIGNIFICANT CHANGE UP (ref 7–14)
BASOPHILS # BLD AUTO: 0.05 K/UL — SIGNIFICANT CHANGE UP (ref 0–0.2)
BASOPHILS NFR BLD AUTO: 0.8 % — SIGNIFICANT CHANGE UP (ref 0–2)
BUN SERPL-MCNC: 12 MG/DL — SIGNIFICANT CHANGE UP (ref 7–23)
C DIFF BY PCR RESULT: SIGNIFICANT CHANGE UP
CALCIUM SERPL-MCNC: 9 MG/DL — SIGNIFICANT CHANGE UP (ref 8.4–10.5)
CHLORIDE SERPL-SCNC: 107 MMOL/L — SIGNIFICANT CHANGE UP (ref 98–107)
CO2 SERPL-SCNC: 23 MMOL/L — SIGNIFICANT CHANGE UP (ref 22–31)
CREAT SERPL-MCNC: 0.89 MG/DL — SIGNIFICANT CHANGE UP (ref 0.5–1.3)
CRP SERPL-MCNC: <3 MG/L — SIGNIFICANT CHANGE UP
CULTURE RESULTS: SIGNIFICANT CHANGE UP
CULTURE RESULTS: SIGNIFICANT CHANGE UP
EGFR: 92 ML/MIN/1.73M2 — SIGNIFICANT CHANGE UP
EOSINOPHIL # BLD AUTO: 0.18 K/UL — SIGNIFICANT CHANGE UP (ref 0–0.5)
EOSINOPHIL NFR BLD AUTO: 3 % — SIGNIFICANT CHANGE UP (ref 0–6)
ERYTHROCYTE [SEDIMENTATION RATE] IN BLOOD: 7 MM/HR — SIGNIFICANT CHANGE UP (ref 1–15)
GI PCR PANEL: SIGNIFICANT CHANGE UP
GLUCOSE SERPL-MCNC: 91 MG/DL — SIGNIFICANT CHANGE UP (ref 70–99)
HCT VFR BLD CALC: 41.7 % — SIGNIFICANT CHANGE UP (ref 39–50)
HCV AB S/CO SERPL IA: 0.13 S/CO — SIGNIFICANT CHANGE UP (ref 0–0.99)
HCV AB SERPL-IMP: SIGNIFICANT CHANGE UP
HGB BLD-MCNC: 14.1 G/DL — SIGNIFICANT CHANGE UP (ref 13–17)
IANC: 3.4 K/UL — SIGNIFICANT CHANGE UP (ref 1.8–7.4)
IMM GRANULOCYTES NFR BLD AUTO: 0.3 % — SIGNIFICANT CHANGE UP (ref 0–0.9)
LYMPHOCYTES # BLD AUTO: 1.83 K/UL — SIGNIFICANT CHANGE UP (ref 1–3.3)
LYMPHOCYTES # BLD AUTO: 30 % — SIGNIFICANT CHANGE UP (ref 13–44)
MAGNESIUM SERPL-MCNC: 2.1 MG/DL — SIGNIFICANT CHANGE UP (ref 1.6–2.6)
MCHC RBC-ENTMCNC: 30.5 PG — SIGNIFICANT CHANGE UP (ref 27–34)
MCHC RBC-ENTMCNC: 33.8 GM/DL — SIGNIFICANT CHANGE UP (ref 32–36)
MCV RBC AUTO: 90.1 FL — SIGNIFICANT CHANGE UP (ref 80–100)
MONOCYTES # BLD AUTO: 0.62 K/UL — SIGNIFICANT CHANGE UP (ref 0–0.9)
MONOCYTES NFR BLD AUTO: 10.2 % — SIGNIFICANT CHANGE UP (ref 2–14)
NEUTROPHILS # BLD AUTO: 3.4 K/UL — SIGNIFICANT CHANGE UP (ref 1.8–7.4)
NEUTROPHILS NFR BLD AUTO: 55.7 % — SIGNIFICANT CHANGE UP (ref 43–77)
NRBC # BLD: 0 /100 WBCS — SIGNIFICANT CHANGE UP (ref 0–0)
NRBC # FLD: 0 K/UL — SIGNIFICANT CHANGE UP (ref 0–0)
PHOSPHATE SERPL-MCNC: 3.1 MG/DL — SIGNIFICANT CHANGE UP (ref 2.5–4.5)
PLATELET # BLD AUTO: 201 K/UL — SIGNIFICANT CHANGE UP (ref 150–400)
POTASSIUM SERPL-MCNC: 4.1 MMOL/L — SIGNIFICANT CHANGE UP (ref 3.5–5.3)
POTASSIUM SERPL-SCNC: 4.1 MMOL/L — SIGNIFICANT CHANGE UP (ref 3.5–5.3)
RBC # BLD: 4.63 M/UL — SIGNIFICANT CHANGE UP (ref 4.2–5.8)
RBC # FLD: 13.8 % — SIGNIFICANT CHANGE UP (ref 10.3–14.5)
SODIUM SERPL-SCNC: 140 MMOL/L — SIGNIFICANT CHANGE UP (ref 135–145)
SPECIMEN SOURCE: SIGNIFICANT CHANGE UP
SPECIMEN SOURCE: SIGNIFICANT CHANGE UP
TSH SERPL-MCNC: 2.72 UIU/ML — SIGNIFICANT CHANGE UP (ref 0.27–4.2)
WBC # BLD: 6.1 K/UL — SIGNIFICANT CHANGE UP (ref 3.8–10.5)
WBC # FLD AUTO: 6.1 K/UL — SIGNIFICANT CHANGE UP (ref 3.8–10.5)

## 2024-01-23 PROCEDURE — 99223 1ST HOSP IP/OBS HIGH 75: CPT | Mod: GC

## 2024-01-23 PROCEDURE — 99232 SBSQ HOSP IP/OBS MODERATE 35: CPT

## 2024-01-23 RX ORDER — POTASSIUM CHLORIDE 20 MEQ
40 PACKET (EA) ORAL ONCE
Refills: 0 | Status: COMPLETED | OUTPATIENT
Start: 2024-01-23 | End: 2024-01-23

## 2024-01-23 RX ORDER — LACTOBACILLUS ACIDOPHILUS 100MM CELL
1 CAPSULE ORAL DAILY
Refills: 0 | Status: DISCONTINUED | OUTPATIENT
Start: 2024-01-23 | End: 2024-01-24

## 2024-01-23 RX ORDER — PSYLLIUM SEED (WITH DEXTROSE)
2 POWDER (GRAM) ORAL
Refills: 0 | Status: DISCONTINUED | OUTPATIENT
Start: 2024-01-23 | End: 2024-01-24

## 2024-01-23 RX ORDER — SODIUM CHLORIDE 9 MG/ML
1000 INJECTION, SOLUTION INTRAVENOUS
Refills: 0 | Status: DISCONTINUED | OUTPATIENT
Start: 2024-01-23 | End: 2024-01-23

## 2024-01-23 RX ORDER — LANOLIN ALCOHOL/MO/W.PET/CERES
3 CREAM (GRAM) TOPICAL AT BEDTIME
Refills: 0 | Status: DISCONTINUED | OUTPATIENT
Start: 2024-01-23 | End: 2024-01-24

## 2024-01-23 RX ORDER — ATORVASTATIN CALCIUM 80 MG/1
1 TABLET, FILM COATED ORAL
Refills: 0 | DISCHARGE

## 2024-01-23 RX ORDER — ACETAMINOPHEN 500 MG
650 TABLET ORAL EVERY 6 HOURS
Refills: 0 | Status: DISCONTINUED | OUTPATIENT
Start: 2024-01-23 | End: 2024-01-24

## 2024-01-23 RX ORDER — ONDANSETRON 8 MG/1
4 TABLET, FILM COATED ORAL EVERY 8 HOURS
Refills: 0 | Status: DISCONTINUED | OUTPATIENT
Start: 2024-01-23 | End: 2024-01-24

## 2024-01-23 RX ORDER — TAMSULOSIN HYDROCHLORIDE 0.4 MG/1
0.4 CAPSULE ORAL AT BEDTIME
Refills: 0 | Status: DISCONTINUED | OUTPATIENT
Start: 2024-01-23 | End: 2024-01-24

## 2024-01-23 RX ORDER — ATORVASTATIN CALCIUM 80 MG/1
40 TABLET, FILM COATED ORAL AT BEDTIME
Refills: 0 | Status: DISCONTINUED | OUTPATIENT
Start: 2024-01-23 | End: 2024-01-24

## 2024-01-23 RX ORDER — SODIUM CHLORIDE 9 MG/ML
1000 INJECTION, SOLUTION INTRAVENOUS
Refills: 0 | Status: DISCONTINUED | OUTPATIENT
Start: 2024-01-23 | End: 2024-01-24

## 2024-01-23 RX ORDER — LIDOCAINE 4 G/100G
1 CREAM TOPICAL DAILY
Refills: 0 | Status: DISCONTINUED | OUTPATIENT
Start: 2024-01-23 | End: 2024-01-24

## 2024-01-23 RX ADMIN — SODIUM CHLORIDE 75 MILLILITER(S): 9 INJECTION, SOLUTION INTRAVENOUS at 01:30

## 2024-01-23 RX ADMIN — Medication 40 MILLIEQUIVALENT(S): at 01:17

## 2024-01-23 RX ADMIN — ATORVASTATIN CALCIUM 40 MILLIGRAM(S): 80 TABLET, FILM COATED ORAL at 22:50

## 2024-01-23 RX ADMIN — Medication 2 PACKET(S): at 17:39

## 2024-01-23 RX ADMIN — TAMSULOSIN HYDROCHLORIDE 0.4 MILLIGRAM(S): 0.4 CAPSULE ORAL at 22:50

## 2024-01-23 RX ADMIN — Medication 1 TABLET(S): at 14:59

## 2024-01-23 RX ADMIN — SODIUM CHLORIDE 75 MILLILITER(S): 9 INJECTION, SOLUTION INTRAVENOUS at 09:40

## 2024-01-23 NOTE — PROGRESS NOTE ADULT - ASSESSMENT
70 yo M with hx stage 3 rectal cancer in remission s/p resection with adjuvant chemo/RT in 2003, radiation proctitis, IBS-D, BPH p/w acute-on-chronic diarrhea/steatorrhea (up to 50-60x in 24h, from baseline 10-15/day) with associated weight loss, with episode of hematochezia x1. CT A/P with no acute findings, GI PCR negative.     # diarrhea  Previously diagnosed as IBS-D; minimal improvement with loperamide, lomotil, metronidazole and prednisone. Now p/w frequent malodorous soft stools with mucous, exacerbated by eating. Ddx includes IBD (previous colonoscopies negative, ESR/CRP negative), infectious colitis (WBC normal, afebrile, GI PCR negative) vs microscopic colitis (prior colonoscopies negative), malabsorptive process vs IBS-D vs chronic radiation proctitis although does not explain acute worsening of symptoms.    - CT A/P with no acute findings  - GI PCR negative   - CBC, ESR/CRP, TSH all WNL     Recommendations:   - f/u stool cultures  - f/u C. diff studies   - f/u stool O&P   - trend stool count  - fecal fat, calprotectin, celiac serologies?,   - consider repeat endoscopic eval?     # hematochezia  Episode of hematochezia x1 after onset of frequent diarrhea, self-resolving and HDS with normal Hgb on presentation. Likely LGIB, ddx includes chronic radiation proctitis vs diverticular bleed vs hemorrhoids vs AVM vs infectious colitis/gastroenteritis in context of acute diarrhea.   Recommendations:   - monitor sx's  - trend CBC  - if acute bleeding with hemodynamic instability, consider CTA + IR consult for possible embolization    * Incomplete - to be staffed with GI attending *

## 2024-01-23 NOTE — PROGRESS NOTE ADULT - SUBJECTIVE AND OBJECTIVE BOX
Chief Complaint:  Patient is a 69y old  Male who presents with a chief complaint of Bloody diarrhea (22 Jan 2024 22:58)    HPI:  70yo male with past medical history of colon cancer (20 years ago) in remission, radiation proctitis, IBS-D, BPH ; Pt coming in with 3 to 4 weeks of diarrhea which has acutely worsened over last 2-3 days. Patient states that at baseline he typically has 10-15 bowel movements per day, however over the last 3 weeks this has increased and he then had acute worsening of his symptoms over the last 3 days. Presented to outpatient gastroenterologist for this complaint, was prescribed metronidazole and prednisone which he believes further exacerbated his symptoms. On 1/21 patient's frequency of BMs increased to 50-60 episodes. Had one episode of hematochezia which has never happened before, then had gradual decrease in the amount of blood with each BM until hematochezia eventually resolved. Stools are light brown-yellow, soft but not watery, coated in mucous, malodorous and typically float. Also complains of malodorous frequent flatus. Denies any further black or bloody stools since episode of hematochezia yesterday. Denies abdominal pain or rectal pain at rest, develops cramping with urge to defecate that is resolved with defaecation. Also notes occasional LLQ pain with defecation not present at rest. Denies nausea/vomiting, fevers, chills, myalgias, no sick contacts, no new foods or medications. No incontinence. Patient states he lost 16 lbs in 24 hrs due to frequent bowel movements. Has not eaten much in the last 48 hours as eating exacerbates symptoms. States frequency of BMs has now improved dramatically with reduced PO intake.     In ED, afebrile and HDS, initial labs WNL with K 3.5. Home medications held, given IV fluids. CT abdomen pelvis with no acute findings. Stool studies collected.     Patient is s/p resection of stage 3 rectal carcinoma in 2003 now in remission, received adjuvant chemo/RT. Has tried loperamide, lomotil, opium tincture with minimal relief of symptoms. Last colonoscopy 3/2023, moderate gross inflammation in rectum, several biopsies obtained in ascending, transverse, descending colon and rectum with no evidence of colitis, granuloma, or dysplasia.     Allergies:  No Known Allergies    Home Medications:    Hospital Medications:  acetaminophen     Tablet .. 650 milliGRAM(s) Oral every 6 hours PRN  atorvastatin 40 milliGRAM(s) Oral at bedtime  lactated ringers. 1000 milliLiter(s) IV Continuous <Continuous>  lactobacillus acidophilus 1 Tablet(s) Oral daily  melatonin 3 milliGRAM(s) Oral at bedtime PRN  ondansetron Injectable 4 milliGRAM(s) IV Push every 8 hours PRN  tamsulosin 0.4 milliGRAM(s) Oral at bedtime      PMHX/PSHX:  History of BPH    Colon cancer        Family history:  Family history of pacemaker (Father, Mother)        Social History:     ROS:     General:  No wt loss, fevers, chills, night sweats, fatigue,   Eyes:  Good vision, no reported pain  ENT:  No sore throat, pain, runny nose, dysphagia  CV:  No pain, palpitations, hypo/hypertension  Resp:  No dyspnea, cough, tachypnea, wheezing  GI:  No pain, No nausea, No vomiting, No diarrhea, No constipation, No weight loss, No fever, No pruritis, No rectal bleeding, No tarry stools, No dysphagia,  :  No pain, bleeding, incontinence, nocturia  Muscle:  No pain, weakness  Neuro:  No weakness, tingling, memory problems  Psych:  No fatigue, insomnia, mood problems, depression  Endocrine:  No polyuria, polydipsia, cold/heat intolerance  Heme:  No petechiae, ecchymosis, easy bruisability  Skin:  No rash, tattoos, scars, edema      PHYSICAL EXAM:     GENERAL:  Appears stated age, well-groomed, well-nourished, no distress  HEENT:  NC/AT,  conjunctivae clear and pink, sclera -anicteric, moist mucous membranes  CHEST:  Full & symmetric excursion, no resp distress   HEART: Regular rhythm, S1, S2, no murmur/rub/S3/S4, no cyanosis  ABDOMEN:  Soft, non-tender, non-distended, normoactive bowel sounds,  no masses ,no hepato-splenomegaly, no signs of chronic liver disease  EXTEREMITIES: no cyanosis,clubbing or edema  SKIN: No rash/erythema/ecchymoses/petechiae/wounds/abscess/warm/dry  NEURO: A&Ox4, no tremor, no encephalopathy    Vital Signs:  Vital Signs Last 24 Hrs  T(C): 36.7 (23 Jan 2024 07:29), Max: 36.8 (22 Jan 2024 11:52)  T(F): 98 (23 Jan 2024 07:29), Max: 98.2 (22 Jan 2024 11:52)  HR: 52 (23 Jan 2024 07:29) (50 - 82)  BP: 129/81 (23 Jan 2024 07:29) (119/71 - 135/75)  BP(mean): --  RR: 16 (23 Jan 2024 07:29) (16 - 17)  SpO2: 100% (23 Jan 2024 07:29) (95% - 100%)    Parameters below as of 23 Jan 2024 07:29  Patient On (Oxygen Delivery Method): room air      Daily     Daily     LABS:                        14.1   6.10  )-----------( 201      ( 23 Jan 2024 05:15 )             41.7     Mean Cell Volume: 90.1 fL (01-23-24 @ 05:15)    01-23    140  |  107  |  12  ----------------------------<  91  4.1   |  23  |  0.89    Ca    9.0      23 Jan 2024 05:15  Phos  3.1     01-23  Mg     2.10     01-23    TPro  6.7  /  Alb  3.6  /  TBili  0.7  /  DBili  x   /  AST  22  /  ALT  25  /  AlkPhos  65  01-22    LIVER FUNCTIONS - ( 22 Jan 2024 16:00 )  Alb: 3.6 g/dL / Pro: 6.7 g/dL / ALK PHOS: 65 U/L / ALT: 25 U/L / AST: 22 U/L / GGT: x             Urinalysis Basic - ( 23 Jan 2024 05:15 )    Color: x / Appearance: x / SG: x / pH: x  Gluc: 91 mg/dL / Ketone: x  / Bili: x / Urobili: x   Blood: x / Protein: x / Nitrite: x   Leuk Esterase: x / RBC: x / WBC x   Sq Epi: x / Non Sq Epi: x / Bacteria: x      Amylase Serum--      Lipase serum27       Ammonia--                          14.1   6.10  )-----------( 201      ( 23 Jan 2024 05:15 )             41.7                         13.9   6.90  )-----------( 178      ( 22 Jan 2024 16:00 )             41.1     Imaging:

## 2024-01-23 NOTE — ED ADULT NURSE REASSESSMENT NOTE - NS ED NURSE REASSESS COMMENT FT1
pt sitting quietly in stretcher NAD noted at this time pt admitted to telemetry awaiting bed placement. No new orders at this time pt safety ongoing.

## 2024-01-23 NOTE — PATIENT PROFILE ADULT - FALL HARM RISK - HARM RISK INTERVENTIONS

## 2024-01-23 NOTE — ED ADULT NURSE REASSESSMENT NOTE - NS ED NURSE REASSESS COMMENT FT1
Received pt from Jamaal CHANDLER RN pt sitting quietly in stretcher NAD noted at this time family present at bedside pt admitted to telemetry awaiting bed placement. No new orders at this time pt safety ongoing.

## 2024-01-23 NOTE — CONSULT NOTE ADULT - SUBJECTIVE AND OBJECTIVE BOX
Chief Complaint:  Patient is a 69y old  Male who presents with a chief complaint of Bloody diarrhea (22 Jan 2024 22:58)    HPI:  68yo male with past medical history of colon cancer (20 years ago) in remission, radiation proctitis, IBS-D, BPH ; Pt coming in with 3 to 4 weeks of diarrhea which has acutely worsened over last 2-3 days. Patient states that at baseline he typically has 10-15 bowel movements per day, however over the last 3 weeks this has increased and he then had acute worsening of his symptoms over the last 3 days. Presented to outpatient gastroenterologist for this complaint, was prescribed metronidazole and prednisone which he believes further exacerbated his symptoms. On 1/21 patient's frequency of BMs increased to 50-60 episodes. Had one episode of hematochezia which has never happened before, then had gradual decrease in the amount of blood with each BM until hematochezia eventually resolved. Stools are light brown-yellow, soft but not watery, coated in mucous, malodorous and typically float. Also complains of malodorous frequent flatus. Denies any further black or bloody stools since episode of hematochezia yesterday. Denies abdominal pain or rectal pain at rest, develops cramping with urge to defecate that is resolved with defaecation. Also notes occasional LLQ pain with defecation not present at rest. Denies nausea/vomiting, fevers, chills, myalgias, no sick contacts, no new foods or medications. No incontinence. Patient states he lost 16 lbs in 24 hrs due to frequent bowel movements. Has not eaten much in the last 48 hours as eating exacerbates symptoms. States frequency of BMs has now improved dramatically with reduced PO intake.     In ED, afebrile and HDS, initial labs WNL with K 3.5. Home medications held, given IV fluids. CT abdomen pelvis with no acute findings. Stool studies collected.     Patient is s/p resection of stage 3 rectal carcinoma in 2003 now in remission, received adjuvant chemo/RT. Has tried loperamide, lomotil, opium tincture with minimal relief of symptoms. Last colonoscopy 3/2023, moderate gross inflammation in rectum, several biopsies obtained in ascending, transverse, descending colon and rectum with no evidence of colitis, granuloma, or dysplasia.     Allergies:  No Known Allergies    Home Medications:    Hospital Medications:  acetaminophen     Tablet .. 650 milliGRAM(s) Oral every 6 hours PRN  atorvastatin 40 milliGRAM(s) Oral at bedtime  lactated ringers. 1000 milliLiter(s) IV Continuous <Continuous>  lactobacillus acidophilus 1 Tablet(s) Oral daily  melatonin 3 milliGRAM(s) Oral at bedtime PRN  ondansetron Injectable 4 milliGRAM(s) IV Push every 8 hours PRN  tamsulosin 0.4 milliGRAM(s) Oral at bedtime      PMHX/PSHX:  History of BPH    Colon cancer        Family history:  Family history of pacemaker (Father, Mother)        Social History:     ROS:     General:  No wt loss, fevers, chills, night sweats, fatigue,   Eyes:  Good vision, no reported pain  ENT:  No sore throat, pain, runny nose, dysphagia  CV:  No pain, palpitations, hypo/hypertension  Resp:  No dyspnea, cough, tachypnea, wheezing  GI:  No pain, No nausea, No vomiting, No diarrhea, No constipation, No weight loss, No fever, No pruritis, No rectal bleeding, No tarry stools, No dysphagia,  :  No pain, bleeding, incontinence, nocturia  Muscle:  No pain, weakness  Neuro:  No weakness, tingling, memory problems  Psych:  No fatigue, insomnia, mood problems, depression  Endocrine:  No polyuria, polydipsia, cold/heat intolerance  Heme:  No petechiae, ecchymosis, easy bruisability  Skin:  No rash, tattoos, scars, edema      PHYSICAL EXAM:     GENERAL:  Appears stated age, well-groomed, well-nourished, no distress  HEENT:  NC/AT,  conjunctivae clear and pink, sclera -anicteric, moist mucous membranes  CHEST:  Full & symmetric excursion, no resp distress   HEART: Regular rhythm, S1, S2, no murmur/rub/S3/S4, no cyanosis  ABDOMEN:  Soft, non-tender, non-distended, normoactive bowel sounds,  no masses ,no hepato-splenomegaly, no signs of chronic liver disease  EXTEREMITIES: no cyanosis,clubbing or edema  SKIN: No rash/erythema/ecchymoses/petechiae/wounds/abscess/warm/dry  NEURO: A&Ox4, no tremor, no encephalopathy    Vital Signs:  Vital Signs Last 24 Hrs  T(C): 36.7 (23 Jan 2024 07:29), Max: 36.8 (22 Jan 2024 11:52)  T(F): 98 (23 Jan 2024 07:29), Max: 98.2 (22 Jan 2024 11:52)  HR: 52 (23 Jan 2024 07:29) (50 - 82)  BP: 129/81 (23 Jan 2024 07:29) (119/71 - 135/75)  BP(mean): --  RR: 16 (23 Jan 2024 07:29) (16 - 17)  SpO2: 100% (23 Jan 2024 07:29) (95% - 100%)    Parameters below as of 23 Jan 2024 07:29  Patient On (Oxygen Delivery Method): room air      Daily     Daily     LABS:                        14.1   6.10  )-----------( 201      ( 23 Jan 2024 05:15 )             41.7     Mean Cell Volume: 90.1 fL (01-23-24 @ 05:15)    01-23    140  |  107  |  12  ----------------------------<  91  4.1   |  23  |  0.89    Ca    9.0      23 Jan 2024 05:15  Phos  3.1     01-23  Mg     2.10     01-23    TPro  6.7  /  Alb  3.6  /  TBili  0.7  /  DBili  x   /  AST  22  /  ALT  25  /  AlkPhos  65  01-22    LIVER FUNCTIONS - ( 22 Jan 2024 16:00 )  Alb: 3.6 g/dL / Pro: 6.7 g/dL / ALK PHOS: 65 U/L / ALT: 25 U/L / AST: 22 U/L / GGT: x             Urinalysis Basic - ( 23 Jan 2024 05:15 )    Color: x / Appearance: x / SG: x / pH: x  Gluc: 91 mg/dL / Ketone: x  / Bili: x / Urobili: x   Blood: x / Protein: x / Nitrite: x   Leuk Esterase: x / RBC: x / WBC x   Sq Epi: x / Non Sq Epi: x / Bacteria: x      Amylase Serum--      Lipase serum27       Ammonia--                          14.1   6.10  )-----------( 201      ( 23 Jan 2024 05:15 )             41.7                         13.9   6.90  )-----------( 178      ( 22 Jan 2024 16:00 )             41.1     Imaging:   Chief Complaint:  Patient is a 69y old  Male who presents with a chief complaint of Bloody diarrhea (22 Jan 2024 22:58)    HPI:  70yo male with past medical history of colon cancer (20 years ago) in remission, radiation proctitis, IBS-D, BPH ; Pt coming in with 3 to 4 weeks of diarrhea which has acutely worsened over last 2-3 days. Patient states that at baseline he typically has 10-15 bowel movements per day, however over the last 3 weeks this has increased and he then had acute worsening of his symptoms over the last 3 days. Presented to outpatient gastroenterologist for this complaint, was prescribed metronidazole and prednisone which he believes further exacerbated his symptoms. On 1/21 patient's frequency of BMs increased to 50-60 episodes. Had one episode of hematochezia which has never happened before, then had gradual decrease in the amount of blood with each BM until hematochezia eventually resolved. Stools are light brown-yellow, soft but not watery, coated in mucous, malodorous and typically float. Also complains of malodorous frequent flatus. Denies any further black or bloody stools since episode of hematochezia yesterday. Denies abdominal pain or rectal pain at rest, develops cramping with urge to defecate that is resolved with defaecation. Also notes occasional LLQ pain with defecation not present at rest. Denies nausea/vomiting, fevers, chills, myalgias, no sick contacts, no new foods or medications. No incontinence. Patient states he lost 16 lbs in 24 hrs due to frequent bowel movements. Has not eaten much in the last 48 hours as eating exacerbates symptoms. States frequency of BMs has now improved dramatically with reduced PO intake.     In ED, afebrile and HDS, initial labs WNL with K 3.5. Home medications held, given IV fluids. CT abdomen pelvis with no acute findings. Stool studies collected.     Patient is s/p resection of stage 3 rectal carcinoma in 2003 now in remission, received adjuvant chemo/RT. Has tried loperamide, lomotil, opium tincture with minimal relief of symptoms. Last colonoscopy 3/2023, moderate gross inflammation in rectum, several biopsies obtained in ascending, transverse, descending colon and rectum with no evidence of colitis, granuloma, or dysplasia.     Allergies:  No Known Allergies    Home Medications:    Hospital Medications:  acetaminophen     Tablet .. 650 milliGRAM(s) Oral every 6 hours PRN  atorvastatin 40 milliGRAM(s) Oral at bedtime  lactated ringers. 1000 milliLiter(s) IV Continuous <Continuous>  lactobacillus acidophilus 1 Tablet(s) Oral daily  melatonin 3 milliGRAM(s) Oral at bedtime PRN  ondansetron Injectable 4 milliGRAM(s) IV Push every 8 hours PRN  tamsulosin 0.4 milliGRAM(s) Oral at bedtime      PMHX/PSHX:  History of BPH    Colon cancer      Family history:  Family history of pacemaker (Father, Mother)      Social History: lives alone, retired     ROS:     General:  No wt loss, fevers, chills, night sweats, fatigue,   Eyes:  Good vision, no reported pain  ENT:  No sore throat, pain, runny nose, dysphagia  CV:  No pain, palpitations, hypo/hypertension  Resp:  No dyspnea, cough, tachypnea, wheezing  GI:  No pain, No nausea, No vomiting, No diarrhea, No constipation, No weight loss, No fever, No pruritis, No rectal bleeding, No tarry stools, No dysphagia,  :  No pain, bleeding, incontinence, nocturia  Muscle:  No pain, weakness  Neuro:  No weakness, tingling, memory problems  Psych:  No fatigue, insomnia, mood problems, depression  Endocrine:  No polyuria, polydipsia, cold/heat intolerance  Heme:  No petechiae, ecchymosis, easy bruisability  Skin:  No rash, tattoos, scars, edema      PHYSICAL EXAM:     GENERAL:  Appears stated age, well-groomed, well-nourished, no distress  HEENT:  NC/AT,  conjunctivae clear and pink, sclera -anicteric, moist mucous membranes  CHEST:  Full & symmetric excursion, no resp distress   HEART: Regular rhythm, S1, S2, no murmur/rub/S3/S4, no cyanosis  ABDOMEN:  Soft, non-tender, non-distended, normoactive bowel sounds,  no masses ,no hepato-splenomegaly, no signs of chronic liver disease  EXTEREMITIES: no cyanosis,clubbing or edema  SKIN: No rash/erythema/ecchymoses/petechiae/wounds/abscess/warm/dry  NEURO: A&Ox4, no tremor, no encephalopathy    Vital Signs:  Vital Signs Last 24 Hrs  T(C): 36.7 (23 Jan 2024 07:29), Max: 36.8 (22 Jan 2024 11:52)  T(F): 98 (23 Jan 2024 07:29), Max: 98.2 (22 Jan 2024 11:52)  HR: 52 (23 Jan 2024 07:29) (50 - 82)  BP: 129/81 (23 Jan 2024 07:29) (119/71 - 135/75)  BP(mean): --  RR: 16 (23 Jan 2024 07:29) (16 - 17)  SpO2: 100% (23 Jan 2024 07:29) (95% - 100%)    Parameters below as of 23 Jan 2024 07:29  Patient On (Oxygen Delivery Method): room air      Daily     Daily     LABS:                        14.1   6.10  )-----------( 201      ( 23 Jan 2024 05:15 )             41.7     Mean Cell Volume: 90.1 fL (01-23-24 @ 05:15)    01-23    140  |  107  |  12  ----------------------------<  91  4.1   |  23  |  0.89    Ca    9.0      23 Jan 2024 05:15  Phos  3.1     01-23  Mg     2.10     01-23    TPro  6.7  /  Alb  3.6  /  TBili  0.7  /  DBili  x   /  AST  22  /  ALT  25  /  AlkPhos  65  01-22    LIVER FUNCTIONS - ( 22 Jan 2024 16:00 )  Alb: 3.6 g/dL / Pro: 6.7 g/dL / ALK PHOS: 65 U/L / ALT: 25 U/L / AST: 22 U/L / GGT: x             Urinalysis Basic - ( 23 Jan 2024 05:15 )    Color: x / Appearance: x / SG: x / pH: x  Gluc: 91 mg/dL / Ketone: x  / Bili: x / Urobili: x   Blood: x / Protein: x / Nitrite: x   Leuk Esterase: x / RBC: x / WBC x   Sq Epi: x / Non Sq Epi: x / Bacteria: x      Amylase Serum--      Lipase serum27       Ammonia--                          14.1   6.10  )-----------( 201      ( 23 Jan 2024 05:15 )             41.7                         13.9   6.90  )-----------( 178      ( 22 Jan 2024 16:00 )             41.1     Imaging:   Chief Complaint:  Patient is a 69y old  Male who presents with a chief complaint of Bloody diarrhea (22 Jan 2024 22:58)    HPI:  68yo male with past medical history of colon cancer (20 years ago) in remission, IBS-D, BPH ; Pt coming in with 3 to 4 weeks of diarrhea which has acutely worsened over last 2-3 days. Patient states that at baseline he typically has 10-15 bowel movements per day, however over the last 3 weeks this has increased and he then had acute worsening of his symptoms over the last 3 days. Presented to outpatient gastroenterologist for this complaint, was prescribed metronidazole and prednisone which he believes further exacerbated his symptoms. On 1/21 patient's frequency of BMs increased to 50-60 episodes. Had one episode of hematochezia which has never happened before, then had gradual decrease in the amount of blood with each BM until hematochezia eventually resolved. Stools are light brown-yellow, soft but not watery, coated in mucous, malodorous and typically float. Also complains of malodorous frequent flatus. Denies any further black or bloody stools since episode of hematochezia yesterday. Denies abdominal pain or rectal pain at rest, develops cramping with urge to defecate that is resolved with defaecation. Also notes occasional LLQ pain with defecation not present at rest. Denies nausea/vomiting, fevers, chills, myalgias, no sick contacts, no new foods or medications. No incontinence. Patient states he lost 16 lbs in 24 hrs due to frequent bowel movements. Has not eaten much in the last 48 hours as eating exacerbates symptoms. States frequency of BMs has now improved dramatically with reduced PO intake.     In ED, afebrile and HDS, initial labs WNL with K 3.5. Home medications held, given IV fluids. CT abdomen pelvis with no acute findings. Stool studies collected.     Patient is s/p resection of stage 3 rectal carcinoma in 2003 now in remission, received adjuvant chemo/RT. Has tried loperamide, lomotil, opium tincture with minimal relief of symptoms. Last colonoscopy 3/2023, moderate gross inflammation in rectum, several biopsies obtained in ascending, transverse, descending colon and rectum with no evidence of colitis, granuloma, or dysplasia.     Allergies:  No Known Allergies    Home Medications:    Hospital Medications:  acetaminophen     Tablet .. 650 milliGRAM(s) Oral every 6 hours PRN  atorvastatin 40 milliGRAM(s) Oral at bedtime  lactated ringers. 1000 milliLiter(s) IV Continuous <Continuous>  lactobacillus acidophilus 1 Tablet(s) Oral daily  melatonin 3 milliGRAM(s) Oral at bedtime PRN  ondansetron Injectable 4 milliGRAM(s) IV Push every 8 hours PRN  tamsulosin 0.4 milliGRAM(s) Oral at bedtime      PMHX/PSHX:  History of BPH    Colon cancer      Family history:  Family history of pacemaker (Father, Mother)      Social History: lives alone, retired     ROS:     General:  No wt loss, fevers, chills, night sweats, fatigue,   Eyes:  Good vision, no reported pain  ENT:  No sore throat, pain, runny nose, dysphagia  CV:  No pain, palpitations, hypo/hypertension  Resp:  No dyspnea, cough, tachypnea, wheezing  GI:  No pain, No nausea, No vomiting, No diarrhea, No constipation, No weight loss, No fever, No pruritis, No rectal bleeding, No tarry stools, No dysphagia,  :  No pain, bleeding, incontinence, nocturia  Muscle:  No pain, weakness  Neuro:  No weakness, tingling, memory problems  Psych:  No fatigue, insomnia, mood problems, depression  Endocrine:  No polyuria, polydipsia, cold/heat intolerance  Heme:  No petechiae, ecchymosis, easy bruisability  Skin:  No rash, tattoos, scars, edema      PHYSICAL EXAM:     GENERAL:  Appears stated age, well-groomed, well-nourished, no distress  HEENT:  NC/AT,  conjunctivae clear and pink, sclera -anicteric, moist mucous membranes  CHEST:  Full & symmetric excursion, no resp distress   HEART: Regular rhythm, S1, S2, no murmur/rub/S3/S4, no cyanosis  ABDOMEN:  Soft, non-tender, non-distended, normoactive bowel sounds,  no masses ,no hepato-splenomegaly, no signs of chronic liver disease  EXTEREMITIES: no cyanosis,clubbing or edema  SKIN: No rash/erythema/ecchymoses/petechiae/wounds/abscess/warm/dry  NEURO: A&Ox4, no tremor, no encephalopathy    Vital Signs:  Vital Signs Last 24 Hrs  T(C): 36.7 (23 Jan 2024 07:29), Max: 36.8 (22 Jan 2024 11:52)  T(F): 98 (23 Jan 2024 07:29), Max: 98.2 (22 Jan 2024 11:52)  HR: 52 (23 Jan 2024 07:29) (50 - 82)  BP: 129/81 (23 Jan 2024 07:29) (119/71 - 135/75)  BP(mean): --  RR: 16 (23 Jan 2024 07:29) (16 - 17)  SpO2: 100% (23 Jan 2024 07:29) (95% - 100%)    Parameters below as of 23 Jan 2024 07:29  Patient On (Oxygen Delivery Method): room air      Daily     Daily     LABS:                        14.1   6.10  )-----------( 201      ( 23 Jan 2024 05:15 )             41.7     Mean Cell Volume: 90.1 fL (01-23-24 @ 05:15)    01-23    140  |  107  |  12  ----------------------------<  91  4.1   |  23  |  0.89    Ca    9.0      23 Jan 2024 05:15  Phos  3.1     01-23  Mg     2.10     01-23    TPro  6.7  /  Alb  3.6  /  TBili  0.7  /  DBili  x   /  AST  22  /  ALT  25  /  AlkPhos  65  01-22    LIVER FUNCTIONS - ( 22 Jan 2024 16:00 )  Alb: 3.6 g/dL / Pro: 6.7 g/dL / ALK PHOS: 65 U/L / ALT: 25 U/L / AST: 22 U/L / GGT: x             Urinalysis Basic - ( 23 Jan 2024 05:15 )    Color: x / Appearance: x / SG: x / pH: x  Gluc: 91 mg/dL / Ketone: x  / Bili: x / Urobili: x   Blood: x / Protein: x / Nitrite: x   Leuk Esterase: x / RBC: x / WBC x   Sq Epi: x / Non Sq Epi: x / Bacteria: x      Amylase Serum--      Lipase serum27       Ammonia--                          14.1   6.10  )-----------( 201      ( 23 Jan 2024 05:15 )             41.7                         13.9   6.90  )-----------( 178      ( 22 Jan 2024 16:00 )             41.1     Imaging:   Chief Complaint:  Patient is a 69y old  Male who presents with a chief complaint of Bloody diarrhea (22 Jan 2024 22:58)    HPI:  70yo male with past medical history of colon cancer (20 years ago) in remission, IBS-D, BPH ; Pt coming in with 3 to 4 weeks of diarrhea which has acutely worsened over last 2-3 days. Patient states that at baseline he typically has 10-15 bowel movements per day, however over the last 3 weeks this has increased and he then had acute worsening of his symptoms over the last 3 days. Presented to outpatient gastroenterologist for this complaint, was prescribed metronidazole and prednisone which he believes further exacerbated his symptoms. On 1/21 patient's frequency of BMs increased to 50-60 episodes. Had one episode of hematochezia which has never happened before, then had gradual decrease in the amount of blood with each BM until hematochezia eventually resolved. Stools are light brown-yellow, soft but not watery, coated in mucous, malodorous and typically float. Also complains of malodorous frequent flatus. Denies any further black or bloody stools since episode of hematochezia yesterday. Denies abdominal pain or rectal pain at rest, develops cramping with urge to defecate that is resolved with defaecation. Also notes occasional LLQ pain with defecation not present at rest. Denies nausea/vomiting, fevers, chills, myalgias, no sick contacts, no new foods or medications. No incontinence. Patient states he lost 16 lbs in 24 hrs due to frequent bowel movements. Has not eaten much in the last 48 hours as eating exacerbates symptoms. States frequency of BMs has now improved dramatically with reduced PO intake.     In ED, afebrile and HDS, initial labs WNL with K 3.5. Home medications held, given IV fluids. CT abdomen pelvis with no acute findings. Stool studies collected.     Patient is s/p resection of stage 3 rectal carcinoma in 2003 now in remission, received adjuvant chemo/RT. Has tried loperamide, lomotil, opium tincture with minimal relief of symptoms. Last colonoscopy 3/2023, moderate gross inflammation in rectum, several biopsies obtained in ascending, transverse, descending colon and rectum with no evidence of colitis, granuloma, or dysplasia.     Allergies:  No Known Allergies    Home Medications:    Hospital Medications:  acetaminophen     Tablet .. 650 milliGRAM(s) Oral every 6 hours PRN  atorvastatin 40 milliGRAM(s) Oral at bedtime  lactated ringers. 1000 milliLiter(s) IV Continuous <Continuous>  lactobacillus acidophilus 1 Tablet(s) Oral daily  melatonin 3 milliGRAM(s) Oral at bedtime PRN  ondansetron Injectable 4 milliGRAM(s) IV Push every 8 hours PRN  tamsulosin 0.4 milliGRAM(s) Oral at bedtime      PMHX/PSHX:  History of BPH    Colon cancer      Family history:  Family history of pacemaker (Father, Mother)      Social History: lives alone, retired, denies current smoking    ROS:     General:  No wt loss, fevers, chills, night sweats, fatigue,   Eyes:  Good vision, no reported pain  ENT:  No sore throat, pain, runny nose, dysphagia  CV:  No pain, palpitations, hypo/hypertension  Resp:  No dyspnea, cough, tachypnea, wheezing  GI:  No pain, No nausea, No vomiting, No diarrhea, No constipation, No weight loss, No fever, No pruritis, No rectal bleeding, No tarry stools, No dysphagia,  :  No pain, bleeding, incontinence, nocturia  Muscle:  No pain, weakness  Neuro:  No weakness, tingling, memory problems  Psych:  No fatigue, insomnia, mood problems, depression  Endocrine:  No polyuria, polydipsia, cold/heat intolerance  Heme:  No petechiae, ecchymosis, easy bruisability  Skin:  No rash, tattoos, scars, edema      PHYSICAL EXAM:     GENERAL:  Appears stated age, well-groomed, well-nourished, no distress  HEENT:  NC/AT,  conjunctivae clear and pink, sclera -anicteric, moist mucous membranes  NECK: supple  CHEST:  Full & symmetric excursion, no resp distress   HEART: Regular rhythm, S1, S2, no murmur/rub/S3/S4, no cyanosis  ABDOMEN:  Soft, non-tender, non-distended, normoactive bowel sounds,  no masses ,no hepato-splenomegaly, no signs of chronic liver disease  EXTEREMITIES: no cyanosis,clubbing or edema  SKIN: No rash/erythema/ecchymoses/petechiae/wounds/abscess/warm/dry  NEURO: A&Ox4, no tremor, no encephalopathy  PSYCH: Normal affect    Vital Signs:  Vital Signs Last 24 Hrs  T(C): 36.7 (23 Jan 2024 07:29), Max: 36.8 (22 Jan 2024 11:52)  T(F): 98 (23 Jan 2024 07:29), Max: 98.2 (22 Jan 2024 11:52)  HR: 52 (23 Jan 2024 07:29) (50 - 82)  BP: 129/81 (23 Jan 2024 07:29) (119/71 - 135/75)  BP(mean): --  RR: 16 (23 Jan 2024 07:29) (16 - 17)  SpO2: 100% (23 Jan 2024 07:29) (95% - 100%)    Parameters below as of 23 Jan 2024 07:29  Patient On (Oxygen Delivery Method): room air      Daily     Daily     LABS:                        14.1   6.10  )-----------( 201      ( 23 Jan 2024 05:15 )             41.7     Mean Cell Volume: 90.1 fL (01-23-24 @ 05:15)    01-23    140  |  107  |  12  ----------------------------<  91  4.1   |  23  |  0.89    Ca    9.0      23 Jan 2024 05:15  Phos  3.1     01-23  Mg     2.10     01-23    TPro  6.7  /  Alb  3.6  /  TBili  0.7  /  DBili  x   /  AST  22  /  ALT  25  /  AlkPhos  65  01-22    LIVER FUNCTIONS - ( 22 Jan 2024 16:00 )  Alb: 3.6 g/dL / Pro: 6.7 g/dL / ALK PHOS: 65 U/L / ALT: 25 U/L / AST: 22 U/L / GGT: x             Urinalysis Basic - ( 23 Jan 2024 05:15 )    Color: x / Appearance: x / SG: x / pH: x  Gluc: 91 mg/dL / Ketone: x  / Bili: x / Urobili: x   Blood: x / Protein: x / Nitrite: x   Leuk Esterase: x / RBC: x / WBC x   Sq Epi: x / Non Sq Epi: x / Bacteria: x      Amylase Serum--      Lipase serum27       Ammonia--                          14.1   6.10  )-----------( 201      ( 23 Jan 2024 05:15 )             41.7                         13.9   6.90  )-----------( 178      ( 22 Jan 2024 16:00 )             41.1     Imaging:

## 2024-01-23 NOTE — CONSULT NOTE ADULT - ASSESSMENT
68 yo M with hx stage 3 rectal cancer in remission s/p resection with adjuvant chemo/RT in 2003, radiation proctitis, IBS-D, BPH p/w acute-on-chronic diarrhea/steatorrhea (up to 50-60x in 24h, from baseline 10-15/day) with associated weight loss, with episode of hematochezia x1. CT A/P with no acute findings, GI PCR negative.     # diarrhea  Previously diagnosed as IBS-D; minimal improvement with loperamide, lomotil, metronidazole and prednisone. Now p/w frequent malodorous soft stools with mucous, exacerbated by eating. Ddx includes IBD (previous colonoscopies negative, ESR/CRP negative), infectious colitis (WBC normal, afebrile, GI PCR negative) vs microscopic colitis (prior colonoscopies negative), malabsorptive process vs IBS-D vs chronic radiation proctitis although does not explain acute worsening of symptoms.    - CT A/P with no acute findings  - GI PCR negative   - CBC, ESR/CRP, TSH all WNL     Recommendations:   - f/u stool cultures  - f/u C. diff studies   - f/u stool O&P   - trend stool count  - fecal fat, calprotectin, celiac serologies?,   - consider repeat endoscopic eval?     # hematochezia  Episode of hematochezia x1 after onset of frequent diarrhea, self-resolving and HDS with normal Hgb on presentation. Likely LGIB, ddx includes chronic radiation proctitis vs diverticular bleed vs hemorrhoids vs AVM vs infectious colitis/gastroenteritis in context of acute diarrhea.   Recommendations:   - monitor sx's  - trend CBC  - if acute bleeding with hemodynamic instability, consider CTA + IR consult for possible embolization    * Incomplete - to be staffed with GI attending *    70 yo M with hx stage 3 rectal cancer in remission s/p resection with adjuvant chemo/RT in 2003, radiation proctitis, IBS-D, BPH p/w acute-on-chronic diarrhea/steatorrhea (up to 50-60x in 24h, from baseline 10-15/day) with associated weight loss, with episode of hematochezia x1. CT A/P with no acute findings, GI PCR negative.     # diarrhea  Previously diagnosed as IBS-D; minimal improvement with loperamide, lomotil, metronidazole and prednisone. Now p/w frequent malodorous soft stools with mucous, exacerbated by eating. Ddx includes IBD (previous colonoscopies negative, ESR/CRP negative), infectious colitis (WBC normal, afebrile, GI PCR negative) vs microscopic colitis (prior colonoscopies negative), malabsorptive process vs IBS-D vs chronic radiation proctitis although does not explain acute worsening of symptoms.    - CT A/P with no acute findings  - GI PCR negative   - CBC, ESR/CRP, TSH all WNL     Recommendations:   - f/u stool cultures  - f/u C. diff studies   - f/u stool O&P   - trend stool count  - fecal fat, calprotectin, celiac serologies?,   - consider repeat endoscopic eval?     # hematochezia  Episode of hematochezia x1 after onset of frequent diarrhea, self-resolving and HDS with normal Hgb on presentation. Likely LGIB, ddx includes angioectasia ISO chronic radiation proctitis vs diverticular bleed vs hemorrhoids vs infectious colitis/gastroenteritis in context of acute diarrhea.   Recommendations:   - monitor sx's  - trend CBC  - if acute bleeding with hemodynamic instability, consider CTA + IR consult for possible embolization    * Incomplete - to be staffed with GI attending *    68 yo M with hx stage 3 rectal cancer in remission s/p resection with adjuvant chemo/RT in 2003, radiation proctitis, IBS-D, BPH p/w acute-on-chronic diarrhea/steatorrhea (up to 50-60x in 24h, from baseline 10-15/day) with associated weight loss, with episode of hematochezia x1. CT A/P with no acute findings, GI PCR negative.     # diarrhea  Previously diagnosed as IBS-D; minimal improvement with loperamide, lomotil, metronidazole and prednisone. P/w frequent malodorous soft stools with mucous, exacerbated by eating - c/w osmotic diarrhea. Ddx includes IBS vs malabsorptive syndrome (celiac vs lactose intolerance vs pancreatic insufficiency vs SIBO) vs infectious colitis (WBC normal, afebrile, GI PCR negative). Less likely IBD (low suspicion - previous colonoscopies negative, ESR/CRP negative). May have component of chronic radiation proctitis although does not explain acute worsening of symptoms.     - CT A/P with no acute findings, mod stool in colon   - GI PCR negative   - CBC, ESR/CRP, TSH all WNL    Recommendations:   > f/u stool cultures, C. diff studies, stool O&P   > trend stool count  > send tTG IgA, total IgA to r/o celiac disease  > send stool pancreatic elastase, fecal fat  > recent outpatient colonoscopy 3/2023 with no abnormalities, repeat endoscopic eval not indicated at this time  > recommend bulking fiber supplement,  pt to eat smaller meals more frequently throughout the day  > can advance diet as tolerated    # hematochezia  Episode of hematochezia x1 after onset of frequent diarrhea, self-resolving and HDS with normal Hgb on presentation. Likely LGIB, suspect hemorrhoidal bleed ISO frequent BMs, ddx also includes diverticular bleed vs angioectasia ISO chronic radiation proctitis vs infectious colitis/gastroenteritis in context of acute diarrhea.   Recommendations:   > monitor for further bleeding  > trend CBC  > if acute bleeding with hemodynamic instability, consider CTA + IR consult for possible embolization  > no plan for colonoscopy at this time given recent outpatient colonoscopy with no abnormalities  > can advance diet as tolerated    * Incomplete until attested by GI attending *   68 yo M with hx stage 3 rectal cancer in remission s/p resection with adjuvant chemo/RT in 2003, radiation proctitis, IBS-D, BPH p/w acute-on-chronic diarrhea/steatorrhea (up to 50-60x in 24h, from baseline 10-15/day) with associated weight loss, with episode of hematochezia x1. CT A/P with no acute findings, GI PCR negative.     # diarrhea  Previously diagnosed as IBS-D; minimal improvement with loperamide, lomotil, metronidazole and prednisone. P/w frequent malodorous soft stools with mucous, exacerbated by eating. Ddx includes IBS vs malabsorptive syndrome (celiac vs lactose intolerance vs pancreatic insufficiency vs SIBO) vs infectious colitis (WBC normal, afebrile, GI PCR negative). Less likely IBD (low suspicion - previous colonoscopies negative, ESR/CRP negative). May have component of chronic radiation proctitis although does not explain acute worsening of symptoms.     - CT A/P with no acute findings, mod stool in colon   - GI PCR negative   - CBC, ESR/CRP, TSH all WNL    Recommendations:   > f/u stool cultures, C. diff studies, stool O&P   > trend stool count  > send tTG IgA, total IgA to r/o celiac disease  > send stool pancreatic elastase, fecal fat  > recent outpatient colonoscopy 3/2023 with no abnormalities, repeat endoscopic eval not indicated at this time  > recommend bulking fiber supplement,  pt to eat smaller meals more frequently throughout the day  > can advance diet as tolerated    # hematochezia  Episode of hematochezia x1 after onset of frequent diarrhea, self-resolving and HDS with normal Hgb on presentation. Likely LGIB, suspect hemorrhoidal bleed ISO frequent BMs, ddx also includes diverticular bleed vs angioectasia ISO chronic radiation proctitis vs infectious colitis/gastroenteritis in context of acute diarrhea.   Recommendations:   > monitor for further bleeding  > trend CBC  > if acute bleeding with hemodynamic instability, consider CTA + IR consult for possible embolization  > no plan for colonoscopy at this time given recent outpatient colonoscopy with no abnormalities  > can advance diet as tolerated    * Incomplete until attested by GI attending *   68 yo M with hx stage 3 rectal cancer in remission s/p resection with adjuvant chemo/RT in 2003, radiation proctitis, IBS-D, BPH p/w acute-on-chronic diarrhea/steatorrhea (up to 50-60x in 24h, from baseline 10-15/day) with associated weight loss, with episode of hematochezia x1. CT A/P with no acute findings, GI PCR negative.     # diarrhea  Previously diagnosed as IBS-D; minimal improvement with loperamide, lomotil, metronidazole and prednisone. P/w frequent malodorous soft stools with mucous, exacerbated by eating. Ddx includes IBS vs malabsorptive syndrome (celiac vs lactose intolerance vs pancreatic insufficiency vs SIBO) vs infectious colitis (WBC normal, afebrile, GI PCR negative). Less likely IBD (low suspicion - previous colonoscopies negative, ESR/CRP negative). May have component of chronic radiation proctitis although does not explain acute worsening of symptoms. Suspect patient may have component of functional disturbance 2/2 prior rectosigmoid anastomosis, ie low anterior resection syndrome (LARS).   - CT A/P with no acute findings, mod stool in colon   - GI PCR negative   - CBC, ESR/CRP, TSH all WNL    Recommendations:   > f/u stool cultures, C. diff studies, stool O&P   > trend stool count  > send tTG IgA, total IgA to r/o celiac disease  > send stool pancreatic elastase, fecal fat  > recent outpatient colonoscopy 3/2023 with no abnormalities, repeat endoscopic eval not indicated at this time  > recommend bulking fiber supplement,  pt to eat smaller meals more frequently throughout the day  > can advance diet as tolerated    # hematochezia  Episode of hematochezia x1 after onset of frequent diarrhea, self-resolving and HDS with normal Hgb on presentation. Likely LGIB, suspect hemorrhoidal bleed ISO frequent BMs, ddx also includes diverticular bleed vs angioectasia ISO chronic radiation proctitis vs infectious colitis/gastroenteritis in context of acute diarrhea.   Recommendations:   > monitor for further bleeding  > trend CBC  > if acute bleeding with hemodynamic instability, consider CTA + IR consult for possible embolization  > no plan for colonoscopy at this time given recent outpatient colonoscopy with no abnormalities  > can advance diet as tolerated    * Incomplete until attested by GI attending *   68 yo M with hx stage 3 rectal cancer in remission s/p resection with adjuvant chemo/RT in 2003, chronic frequent soft stools diagnosed as IBS-D, BPH p/w acute-on-chronic diarrhea/steatorrhea (up to 50-60x in 24h, from baseline 10-15/day) with associated weight loss, with episode of hematochezia x1. CT A/P with no acute findings, GI PCR negative.     # diarrhea  Previously diagnosed as IBS-D; minimal improvement with loperamide, lomotil, metronidazole and prednisone. P/w frequent malodorous soft stools with mucous, exacerbated by eating. Ddx includes IBS vs malabsorptive syndrome (celiac vs lactose intolerance vs pancreatic insufficiency vs SIBO) vs infectious colitis (WBC normal, afebrile, GI PCR negative). Less likely IBD (low suspicion - previous colonoscopies negative, ESR/CRP negative), chronic radiation proctitis although would not explain acute worsening of symptoms. Suspect patient may have component of functional disturbance 2/2 prior rectosigmoid anastomosis, ie low anterior resection syndrome (LARS).   - CT A/P with no acute findings, mod stool in colon   - GI PCR negative   - CBC, ESR/CRP, TSH all WNL    Recommendations:   > f/u stool cultures, C. diff studies, stool O&P   > trend stool count  > send tTG IgA, total IgA to r/o celiac disease  > send stool pancreatic elastase, fecal fat  > recent outpatient colonoscopy 3/2023 with no abnormalities, repeat endoscopic eval not indicated at this time  > recommend bulking fiber supplement,  pt to eat smaller meals more frequently throughout the day  > can advance diet as tolerated    # hematochezia  Episode of hematochezia x1 after onset of frequent diarrhea, self-resolving and HDS with normal Hgb on presentation. Likely LGIB, suspect hemorrhoidal bleed ISO frequent BMs, ddx also includes diverticular bleed vs angioectasia ISO chronic radiation proctitis vs infectious colitis/gastroenteritis in context of acute diarrhea.   Recommendations:   > monitor for further bleeding  > trend CBC  > if acute bleeding with hemodynamic instability, consider CTA + IR consult for possible embolization  > no plan for colonoscopy at this time given recent outpatient colonoscopy with no abnormalities  > can advance diet as tolerated    * Incomplete until attested by GI attending *

## 2024-01-23 NOTE — PROGRESS NOTE ADULT - ASSESSMENT
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

## 2024-01-23 NOTE — CONSULT NOTE ADULT - ATTENDING COMMENTS
68 yo Male h/o stage 3 rectal cancer s/p LAR, chemo/RT in 2003, chronic frequent soft stools diagnosed as IBS-D, p/w increased urgency, stool frequency, weight loss, with episode of hematochezia x1. Labs unremarkable, CT A/P with no acute findings (stool noted throughout colon), GI PCR and CDiff negative.    1.  Frequent soft bowel movements.  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  Suspect recent increase with weight loss due to infectious etiology.  Inflammatory markers and CT negative for chronic inflammatory reactions.  Status post colonoscopy in 2023 with unremarkable biopsies by Dr. Casanova.  2.  Hematochezia.  In setting of increased BMs and normal Hb, likely secondary to internal hemorrhoids but anastomotic ulcer or inflammation possible.    Recs:  - Monitor Hb, electrolytes.  - Monitor stool count.  - Check celiac serologies, fecal elastase  - If increased stool count persists, may consider colonoscopy or sigmoidoscopy for further evaluation. 68 yo Male h/o stage 3 rectal cancer s/p LAR, chemo/RT in 2003, chronic frequent soft stools diagnosed as IBS-D, p/w increased urgency, stool frequency, weight loss, with episode of hematochezia x1. Labs unremarkable, CT A/P with no acute findings (stool noted throughout colon), GI PCR and CDiff negative.    1.  Frequent soft bowel movements.  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  Suspect recent increase with weight loss due to infectious etiology.  Differential includes radiation proctitis.  Inflammatory markers and CT negative for chronic inflammatory reactions.  Status post colonoscopy in 2023 with unremarkable biopsies by Dr. Casanova.  2.  Hematochezia.  In setting of increased BMs and normal Hb, likely secondary to internal hemorrhoids but anastomotic ulcer or inflammation possible.    Recs:  - Monitor Hb, electrolytes.  - Monitor stool count.  - Check celiac serologies, fecal elastase  - If increased stool count persists, may consider colonoscopy or sigmoidoscopy for further evaluation.

## 2024-01-23 NOTE — ED ADULT NURSE REASSESSMENT NOTE - NS ED NURSE REASSESS COMMENT FT1
Pt with no acute changes at this time. Pt A&ox4, ambulatory, on room air. VSS. Pt respirations even and unlabored, chest rise and fall equal b/l. Pt denies chest pain, HA, SOB, dizziness, N/V/D, fever/chills. No BM since midnight. Stretcher in lowest position, call bell within reach, pt safety maintained.

## 2024-01-24 ENCOUNTER — TRANSCRIPTION ENCOUNTER (OUTPATIENT)
Age: 69
End: 2024-01-24

## 2024-01-24 VITALS — WEIGHT: 195.11 LBS

## 2024-01-24 LAB
CULTURE RESULTS: SIGNIFICANT CHANGE UP
IGA FLD-MCNC: 259 MG/DL — SIGNIFICANT CHANGE UP (ref 70–400)
IGG FLD-MCNC: 1071 MG/DL — SIGNIFICANT CHANGE UP (ref 700–1600)
IGM SERPL-MCNC: 64 MG/DL — SIGNIFICANT CHANGE UP (ref 40–230)
SPECIMEN SOURCE: SIGNIFICANT CHANGE UP

## 2024-01-24 PROCEDURE — 99232 SBSQ HOSP IP/OBS MODERATE 35: CPT

## 2024-01-24 PROCEDURE — 99232 SBSQ HOSP IP/OBS MODERATE 35: CPT | Mod: GC

## 2024-01-24 RX ORDER — LACTOBACILLUS ACIDOPHILUS 100MM CELL
1 CAPSULE ORAL
Qty: 0 | Refills: 0 | DISCHARGE
Start: 2024-01-24

## 2024-01-24 RX ORDER — LOPERAMIDE HCL 2 MG
1 TABLET ORAL
Refills: 0 | DISCHARGE

## 2024-01-24 RX ORDER — ALFUZOSIN HYDROCHLORIDE 10 MG/1
1 TABLET, EXTENDED RELEASE ORAL
Refills: 0 | DISCHARGE

## 2024-01-24 RX ORDER — PSYLLIUM SEED (WITH DEXTROSE)
2 POWDER (GRAM) ORAL
Qty: 0 | Refills: 0 | DISCHARGE
Start: 2024-01-24

## 2024-01-24 RX ORDER — DIPHENOXYLATE HCL/ATROPINE 2.5-.025MG
1 TABLET ORAL
Refills: 0 | DISCHARGE

## 2024-01-24 RX ORDER — METRONIDAZOLE 500 MG
1 TABLET ORAL
Refills: 0 | DISCHARGE

## 2024-01-24 RX ADMIN — SODIUM CHLORIDE 75 MILLILITER(S): 9 INJECTION, SOLUTION INTRAVENOUS at 06:34

## 2024-01-24 RX ADMIN — Medication 2 PACKET(S): at 06:37

## 2024-01-24 RX ADMIN — Medication 1 TABLET(S): at 12:43

## 2024-01-24 NOTE — DISCHARGE NOTE PROVIDER - NSDCCPGOAL_GEN_ALL_CORE_FT
Assessment/description of ears? Pink intact  Which preventative measures are in place for the ears? n/a     Assessment/description of elbows? Pink intact   Which preventative measures are in place for the elbows? N/a, mepliex refused by patient      Assessment/description of sacrum? Pink and intact   Which preventative measures are in place for the sacrum? Encourage ambulation     Assessment/description of heels? Dry pink and blanching.   Which preventative measures are in place for the heels? Lotion applied, ambulation encouraged      Which devices are in place? wander guard left ankle.   Description of skin under devices: yes   Which preventative measures are in place under devices? Assessing skin     Other: patient skin intact   To get better and follow your care plan as instructed.

## 2024-01-24 NOTE — PROGRESS NOTE ADULT - SUBJECTIVE AND OBJECTIVE BOX
Progress Note   Adrienne Yap PGY4- GI/Hep    SUBJECTIVE: Patient seen and examined at bedside.     OBJECTIVE:    VITAL SIGNS:  ICU Vital Signs Last 24 Hrs  T(C): 36.7 (24 Jan 2024 05:12), Max: 36.9 (23 Jan 2024 18:26)  T(F): 98 (24 Jan 2024 05:12), Max: 98.5 (23 Jan 2024 18:26)  HR: 63 (24 Jan 2024 05:12) (50 - 63)  BP: 119/72 (24 Jan 2024 05:12) (119/72 - 152/84)  BP(mean): --  ABP: --  ABP(mean): --  RR: 17 (24 Jan 2024 05:12) (16 - 18)  SpO2: 97% (24 Jan 2024 05:12) (94% - 100%)    O2 Parameters below as of 24 Jan 2024 05:12  Patient On (Oxygen Delivery Method): room air      PHYSICAL EXAM:    General: NAD  HEENT: NC/AT  Neck: supple  Respiratory: Regular RR, no increase in WOB  Cardiovascular: RRR  Abdomen: soft, NT/ND; +BS x4  Extremities: WWP, 2+ peripheral pulses b/l  Skin: normal color and turgor; no rash  Neurological: A&OX    MEDICATIONS:  MEDICATIONS  (STANDING):  atorvastatin 40 milliGRAM(s) Oral at bedtime  lactated ringers. 1000 milliLiter(s) (75 mL/Hr) IV Continuous <Continuous>  lactobacillus acidophilus 1 Tablet(s) Oral daily  lidocaine   4% Patch 1 Patch Transdermal daily  psyllium Powder 2 Packet(s) Oral two times a day  tamsulosin 0.4 milliGRAM(s) Oral at bedtime    MEDICATIONS  (PRN):  acetaminophen     Tablet .. 650 milliGRAM(s) Oral every 6 hours PRN Temp greater or equal to 38C (100.4F), Mild Pain (1 - 3)  melatonin 3 milliGRAM(s) Oral at bedtime PRN Insomnia  ondansetron Injectable 4 milliGRAM(s) IV Push every 8 hours PRN Nausea and/or Vomiting      ALLERGIES:  Allergies    No Known Allergies    Intolerances        LABS:                        14.1   6.10  )-----------( 201      ( 23 Jan 2024 05:15 )             41.7     01-23    140  |  107  |  12  ----------------------------<  91  4.1   |  23  |  0.89    Ca    9.0      23 Jan 2024 05:15  Phos  3.1     01-23  Mg     2.10     01-23    TPro  6.7  /  Alb  3.6  /  TBili  0.7  /  DBili  x   /  AST  22  /  ALT  25  /  AlkPhos  65  01-22      Urinalysis Basic - ( 23 Jan 2024 05:15 )    Color: x / Appearance: x / SG: x / pH: x  Gluc: 91 mg/dL / Ketone: x  / Bili: x / Urobili: x   Blood: x / Protein: x / Nitrite: x   Leuk Esterase: x / RBC: x / WBC x   Sq Epi: x / Non Sq Epi: x / Bacteria: x         Progress Note   Adrienne Yap PGY4- GI/Hep    SUBJECTIVE: Patient seen and examined at bedside.   - patient with more formed stool after taking fiber  - had about 5BM this AM which is less than before   - no nausea and vomiting     OBJECTIVE:    VITAL SIGNS:  ICU Vital Signs Last 24 Hrs  T(C): 36.7 (24 Jan 2024 05:12), Max: 36.9 (23 Jan 2024 18:26)  T(F): 98 (24 Jan 2024 05:12), Max: 98.5 (23 Jan 2024 18:26)  HR: 63 (24 Jan 2024 05:12) (50 - 63)  BP: 119/72 (24 Jan 2024 05:12) (119/72 - 152/84)  BP(mean): --  ABP: --  ABP(mean): --  RR: 17 (24 Jan 2024 05:12) (16 - 18)  SpO2: 97% (24 Jan 2024 05:12) (94% - 100%)    O2 Parameters below as of 24 Jan 2024 05:12  Patient On (Oxygen Delivery Method): room air      PHYSICAL EXAM:    General: NAD  HEENT: NC/AT  Neck: supple  Respiratory: Regular RR, no increase in WOB  Cardiovascular: RRR  Abdomen: soft, NT/ND; +BS x4  Extremities: WWP, 2+ peripheral pulses b/l, left hip hematoma   Skin: normal color and turgor; no rash  Neurological: A&OX3    MEDICATIONS:  MEDICATIONS  (STANDING):  atorvastatin 40 milliGRAM(s) Oral at bedtime  lactated ringers. 1000 milliLiter(s) (75 mL/Hr) IV Continuous <Continuous>  lactobacillus acidophilus 1 Tablet(s) Oral daily  lidocaine   4% Patch 1 Patch Transdermal daily  psyllium Powder 2 Packet(s) Oral two times a day  tamsulosin 0.4 milliGRAM(s) Oral at bedtime    MEDICATIONS  (PRN):  acetaminophen     Tablet .. 650 milliGRAM(s) Oral every 6 hours PRN Temp greater or equal to 38C (100.4F), Mild Pain (1 - 3)  melatonin 3 milliGRAM(s) Oral at bedtime PRN Insomnia  ondansetron Injectable 4 milliGRAM(s) IV Push every 8 hours PRN Nausea and/or Vomiting      ALLERGIES:  Allergies    No Known Allergies    Intolerances        LABS:                        14.1   6.10  )-----------( 201      ( 23 Jan 2024 05:15 )             41.7     01-23    140  |  107  |  12  ----------------------------<  91  4.1   |  23  |  0.89    Ca    9.0      23 Jan 2024 05:15  Phos  3.1     01-23  Mg     2.10     01-23    TPro  6.7  /  Alb  3.6  /  TBili  0.7  /  DBili  x   /  AST  22  /  ALT  25  /  AlkPhos  65  01-22      Urinalysis Basic - ( 23 Jan 2024 05:15 )    Color: x / Appearance: x / SG: x / pH: x  Gluc: 91 mg/dL / Ketone: x  / Bili: x / Urobili: x   Blood: x / Protein: x / Nitrite: x   Leuk Esterase: x / RBC: x / WBC x   Sq Epi: x / Non Sq Epi: x / Bacteria: x

## 2024-01-24 NOTE — DIETITIAN INITIAL EVALUATION ADULT - PERTINENT MEDS FT
MEDICATIONS  (STANDING):  atorvastatin 40 milliGRAM(s) Oral at bedtime  lactated ringers. 1000 milliLiter(s) (75 mL/Hr) IV Continuous <Continuous>  lactobacillus acidophilus 1 Tablet(s) Oral daily  lidocaine   4% Patch 1 Patch Transdermal daily  psyllium Powder 2 Packet(s) Oral two times a day  tamsulosin 0.4 milliGRAM(s) Oral at bedtime    MEDICATIONS  (PRN):  acetaminophen     Tablet .. 650 milliGRAM(s) Oral every 6 hours PRN Temp greater or equal to 38C (100.4F), Mild Pain (1 - 3)  melatonin 3 milliGRAM(s) Oral at bedtime PRN Insomnia  ondansetron Injectable 4 milliGRAM(s) IV Push every 8 hours PRN Nausea and/or Vomiting

## 2024-01-24 NOTE — DIETITIAN INITIAL EVALUATION ADULT - PERTINENT LABORATORY DATA
01-23    140  |  107  |  12  ----------------------------<  91  4.1   |  23  |  0.89    Ca    9.0      23 Jan 2024 05:15  Phos  3.1     01-23  Mg     2.10     01-23    TPro  6.7  /  Alb  3.6  /  TBili  0.7  /  DBili  x   /  AST  22  /  ALT  25  /  AlkPhos  65  01-22

## 2024-01-24 NOTE — DISCHARGE NOTE PROVIDER - CARE PROVIDERS DIRECT ADDRESSES
[email or web address]

## 2024-01-24 NOTE — DISCHARGE NOTE NURSING/CASE MANAGEMENT/SOCIAL WORK - PATIENT PORTAL LINK FT
You can access the FollowMyHealth Patient Portal offered by [facility] by registering at the following website: [email or web address]. By joining [facility]’s FollowMyHealth portal, you will also be able to view your health information using other applications (apps) compatible with our system.

## 2024-01-24 NOTE — DISCHARGE NOTE PROVIDER - NSDCFUADDAPPT_GEN_ALL_CORE_FT
Follow up with motility specialist Dr. Nathan Reaves for further workup. You may need anorectal manometry as outpatient.

## 2024-01-24 NOTE — PROGRESS NOTE ADULT - SUBJECTIVE AND OBJECTIVE BOX
[facility] Division of Hospital Medicine  Maria Eugenia Kauffman MD  Pager (M-F, 8A-5P): [phone]  Other Times:  [id number]    Patient is a 69y old  Male who presents with a chief complaint of Bloody diarrhea (24 Jan 2024 06:10)      SUBJECTIVE / OVERNIGHT EVENTS:  No acute events overnight.     MEDICATIONS  (STANDING):  atorvastatin 40 milliGRAM(s) Oral at bedtime  lactated ringers. 1000 milliLiter(s) (75 mL/Hr) IV Continuous <Continuous>  lactobacillus acidophilus 1 Tablet(s) Oral daily  lidocaine   4% Patch 1 Patch Transdermal daily  psyllium Powder 2 Packet(s) Oral two times a day  tamsulosin 0.4 milliGRAM(s) Oral at bedtime    MEDICATIONS  (PRN):  acetaminophen     Tablet .. 650 milliGRAM(s) Oral every 6 hours PRN Temp greater or equal to 38C (100.4F), Mild Pain (1 - 3)  melatonin 3 milliGRAM(s) Oral at bedtime PRN Insomnia  ondansetron Injectable 4 milliGRAM(s) IV Push every 8 hours PRN Nausea and/or Vomiting      CAPILLARY BLOOD GLUCOSE        I&O's Summary      PHYSICAL EXAM:  Vital Signs Last 24 Hrs  T(C): 36.7 (24 Jan 2024 05:12), Max: 36.9 (23 Jan 2024 18:26)  T(F): 98 (24 Jan 2024 05:12), Max: 98.5 (23 Jan 2024 18:26)  HR: 63 (24 Jan 2024 05:12) (50 - 63)  BP: 119/72 (24 Jan 2024 05:12) (119/72 - 152/84)  BP(mean): --  RR: 17 (24 Jan 2024 05:12) (17 - 18)  SpO2: 97% (24 Jan 2024 05:12) (94% - 100%)    Parameters below as of 24 Jan 2024 05:12  Patient On (Oxygen Delivery Method): room air        CONSTITUTIONAL: NAD, well-developed, well-groomed  EYES: EOMI; conjunctiva and sclera clear  ENMT: Moist oral mucosa  RESPIRATORY: Normal respiratory effort; lungs are clear to auscultation bilaterally  CARDIOVASCULAR: Regular rate and rhythm, normal S1 and S2, no murmur; No lower extremity edema  ABDOMEN: Nontender to palpation, normoactive bowel sounds, no rebound/guarding  MUSCULOSKELETAL:   no clubbing or cyanosis of digits; no joint swelling or tenderness to palpation  PSYCH: A+O to person, place, and time; affect appropriate  NEUROLOGY: CN 2-12 are intact and symmetric; no gross sensory deficits   SKIN: No rashes; no palpable lesions    LABS:                        14.1   6.10  )-----------( 201      ( 23 Jan 2024 05:15 )             41.7     01-23    140  |  107  |  12  ----------------------------<  91  4.1   |  23  |  0.89    Ca    9.0      23 Jan 2024 05:15  Phos  3.1     01-23  Mg     2.10     01-23    TPro  6.7  /  Alb  3.6  /  TBili  0.7  /  DBili  x   /  AST  22  /  ALT  25  /  AlkPhos  65  01-22          Urinalysis Basic - ( 23 Jan 2024 05:15 )    Color: x / Appearance: x / SG: x / pH: x  Gluc: 91 mg/dL / Ketone: x  / Bili: x / Urobili: x   Blood: x / Protein: x / Nitrite: x   Leuk Esterase: x / RBC: x / WBC x   Sq Epi: x / Non Sq Epi: x / Bacteria: x        Culture - Urine (collected 22 Jan 2024 17:07)  Source: Clean Catch Clean Catch (Midstream)  Final Report (23 Jan 2024 16:06):    <10,000 CFU/mL Normal Urogenital Larissa    Culture - Stool (collected 22 Jan 2024 17:00)  Source: .Stool Feces  Preliminary Report (23 Jan 2024 19:56):    No enteric pathogens to date: Final culture pending      RADIOLOGY & ADDITIONAL TESTS:  Results Reviewed:   Imaging Personally Reviewed:  Electrocardiogram Personally Reviewed:    COORDINATION OF CARE:  Care Discussed with Consultants/Other Providers [Y/N]: Y  Prior or Outpatient Records Reviewed [Y/N]: Y

## 2024-01-24 NOTE — PROGRESS NOTE ADULT - ATTENDING COMMENTS
68 yo Male h/o stage 3 rectal cancer s/p LAR, chemo/RT in 2003, chronic frequent soft stools diagnosed as IBS-D, p/w increased urgency, stool frequency, weight loss, with episode of hematochezia x1. Labs unremarkable, CT A/P with no acute findings (stool noted throughout colon), GI PCR and CDiff negative.    1.  Frequent soft bowel movements.  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  Suspect recent increase with weight loss may be due to infectious etiology.  Differential includes radiation proctitis.  Inflammatory markers and CT negative for chronic inflammatory reactions.  Status post colonoscopy in 2023 with unremarkable biopsies by Dr. Casanova.  2.  Hematochezia.  In setting of increased BMs and normal Hb, likely secondary to internal hemorrhoids but anastomotic ulcer or inflammation possible.    Recs:  - clinically improved with fiber supplements  - Follow up with GI as outpatient

## 2024-01-24 NOTE — DISCHARGE NOTE PROVIDER - NSDCCPCAREPLAN_GEN_ALL_CORE_FT
PRINCIPAL DISCHARGE DIAGNOSIS  Diagnosis: Diarrhea  Assessment and Plan of Treatment: Your GI workup was negative for infection. You were seen by GI and no need for endoscopic intervention at this time. Continue bulking fiber supplement. Eat smaller meals more frequently throughout the day. Follow up outpatient with motility specialist (Dr. Nathan Reaves) as you may need anorectal manometry upon discharge.

## 2024-01-24 NOTE — PROGRESS NOTE ADULT - PROBLEM SELECTOR PLAN 1
Severe diarrhea with bloody episodes up to 50-60 times per day;   CT A. Rectosigmoid anastomosis again noted. Moderate amount of stool in the colon. No bowel wall thickening.  r/o infectious etiology   - appreciate GI recs   - stool studies neg for infection  - TSH, ESR and CBC within acceptable range   - no blood seen in stools  - advance diet to regular  - can do celiac workup and follow up as outpt if recommended by GI
Severe diarrhea with bloody episodes up to 50-60 times per day;   CT A. Rectosigmoid anastomosis again noted. Moderate amount of stool in the colon. No bowel wall thickening.  r/o infectious etiology     -Holding Prednisone, Loperamide and Diphenoxylate-atropine pending infectious w/up  -f/u C diff PCR, GI PCR, Stool Cx  -f/u Ova and Parasites   -f/u TSH, ESR, CRP  -Holding Abx   -Formal GI c/s recs y   -Diet: clears for now pending GI c/s - this was d/w the pt  -IV hydration: LR  -Supp empirically potassium   -Monitor lytes and renal function daily

## 2024-01-24 NOTE — DISCHARGE NOTE PROVIDER - HOSPITAL COURSE
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 CTAP shows Rectosigmoid anastomosis again noted. Moderate amount of stool in the colon. No bowel wall thickening. Stool studies negative for infection. TSH, ESR and CBC within acceptable range. No blood noted in stool in hospital. Advanced diet to regular, pt tolerating well. GI consulted. Recent outpatient colonoscopy 3/2023 with no abnormalities, thus repeat endoscopic eval not indicated at this time. Started bulking fiber supplement and counseled pt to eat smaller meals more frequently throughout the day. No contraindication to discharge from GI standpoint with outpatient f/u with motility specialist (Dr. Nathan Reaves) as patient may need anorectal manometry outpatient.

## 2024-01-24 NOTE — DIETITIAN INITIAL EVALUATION ADULT - NS FNS DIET ORDER
Diet, Regular:   High Fiber (HIFIBER)  Lactose Restricted (Milk Sugar Intoler.) (01-24-24 @ 11:48)

## 2024-01-24 NOTE — DIETITIAN INITIAL EVALUATION ADULT - ORAL INTAKE PTA/DIET HISTORY
Pt reports his appetite has been "ok", pt reports he consumes two meals daily, usually consume large meal at later of the night. Pt c/o persistent loose stool 2/2 s/p radiation tx of his colon cancer. Pt reports recently his diarrhea has been worsening over the past 4 wks. Reported had dairrhea~50-60times 1day PTA. Pt reported 16lbs weight loss x 4days.

## 2024-01-24 NOTE — DIETITIAN INITIAL EVALUATION ADULT - OTHER INFO
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     Patient seen at bedside. Reports ~50% breakfast intake this morning. Pt's diet initiated on full liquid this morning, diet has been advanced to high fiber regular diet. Pt reports lactose intolerance, can't consume dairy product. Will remove dairy products from tray as per pt's request. Pt is amenable to receive Orgain Shake 3x daily (690cal, 48gm pro) to provide optimal nutrition. IV fluid provided for hydration. Pt reported diarrhea yesterday, GI is following. Noted pt currently on probiotics and fiber supplement. Defer diet management to GI.   Nutrition education provided on diarrhea nutrition therapy. Encouraged pt to consume small frequent meals. Discussed the differences between soluble ans insoluble fiber choices. Encouraged pt to consume soluble fiber to bulk stool. Pt shows understanding. RD to remain available for further nutritional interventions as indicated. Pt shows understanding. RD to remain available for further nutritional interventions as indicated.

## 2024-01-24 NOTE — PROGRESS NOTE ADULT - NSPROGADDITIONALINFOA_GEN_ALL_CORE
discharge planning and coordination ~ 45mins. Will follow up with GI - if cleared from GI perspective and pt tolerating po, then would d/c home with outpt followup.  discharge planning and coordination ~ 45mins.

## 2024-01-24 NOTE — DIETITIAN INITIAL EVALUATION ADULT - NS FNS WEIGHT CHANGE REASON
Pt reported ~16lbs weight loss x 4days. Reported previous weight 202lbs, most recent weighted at home 186lbs PTA. Pt's adm weight 195.2lbs (1/23). Likely pt's reported recent weight is inaccurate due to scale error./unintentional

## 2024-01-24 NOTE — DISCHARGE NOTE PROVIDER - NSDCMRMEDTOKEN_GEN_ALL_CORE_FT
atorvastatin 40 mg oral tablet: 1 tab(s) orally once a day (at bedtime)  Flomax 0.4 mg oral capsule: 1 cap(s) orally once a day   lactobacillus acidophilus oral capsule: 1 cap(s) orally once a day  oxyCODONE 5 mg oral capsule: 1 cap(s) orally every 8 hours MDD:15mg  psyllium 3.4 g/7 g oral powder for reconstitution: 2 packet(s) orally 2 times a day

## 2024-01-24 NOTE — DIETITIAN INITIAL EVALUATION ADULT - ADD RECOMMEND
1) Recommend continue with current diet, which remains appropriate at this time.   2) Encourage PO intake and honor food preferences as able. Will provide Orgain Shake 3x daily (690cal, 48gm pro) to provide optimal nutrition.   3) Monitor PO intake, Labs, weights, BMs, and skin integrity.   4) RD to remain available for further nutritional interventions as indicated.

## 2024-01-24 NOTE — PROGRESS NOTE ADULT - ASSESSMENT
70 yo M with hx stage 3 rectal cancer in remission s/p resection with adjuvant chemo/RT in 2003, chronic frequent soft stools diagnosed as IBS-D, BPH p/w acute-on-chronic diarrhea/steatorrhea (up to 50-60x in 24h, from baseline 10-15/day) with associated weight loss, with episode of hematochezia x1. CT A/P with no acute findings, GI PCR negative.     # diarrhea  Previously diagnosed as IBS-D; minimal improvement with loperamide, lomotil, metronidazole and prednisone. P/w frequent malodorous soft stools with mucous, exacerbated by eating. Ddx includes IBS vs malabsorptive syndrome (celiac vs lactose intolerance vs pancreatic insufficiency vs SIBO) vs infectious colitis (WBC normal, afebrile, GI PCR negative). Less likely IBD (low suspicion - previous colonoscopies negative, ESR/CRP negative), chronic radiation proctitis although would not explain acute worsening of symptoms. Suspect patient may have component of functional disturbance 2/2 prior rectosigmoid anastomosis, ie low anterior resection syndrome (LARS).   - CT A/P with no acute findings, mod stool in colon   - GI PCR negative   - CBC, ESR/CRP, TSH all WNL  - c.diff negative    Recommendations:   > trend stool count  > send tTG IgA, total IgA to r/o celiac disease  > send stool pancreatic elastase, fecal fat  > recent outpatient colonoscopy 3/2023 with no abnormalities, repeat endoscopic eval not indicated at this time  > recommend bulking fiber supplement,  pt to eat smaller meals more frequently throughout the day  > can advance diet as tolerated    # hematochezia  Episode of hematochezia x1 after onset of frequent diarrhea, self-resolving and HDS with normal Hgb on presentation. Likely LGIB, suspect hemorrhoidal bleed ISO frequent BMs, ddx also includes diverticular bleed vs angioectasia ISO chronic radiation proctitis vs infectious colitis/gastroenteritis in context of acute diarrhea.   Recommendations:   > monitor for further bleeding  > trend CBC  > if acute bleeding with hemodynamic instability, consider CTA + IR consult for possible embolization  > no plan for colonoscopy at this time given recent outpatient colonoscopy with no abnormalities  > can advance diet as tolerated    * Incomplete until attested by GI attending *    Adrienne Escobar MD  Gastroenterology/Hepatology Fellow, PGY-4  Please contact via TEAMS    NON-URGENT CONSULTS:  Please email [email or web address]  [email or web address]     70 yo M with hx stage 3 rectal cancer in remission s/p resection with adjuvant chemo/RT in 2003, chronic frequent soft stools diagnosed as IBS-D, BPH p/w acute-on-chronic diarrhea/steatorrhea (up to 50-60x in 24h, from baseline 10-15/day) with associated weight loss, with episode of hematochezia x1. CT A/P with no acute findings, GI PCR negative.     # diarrhea  Previously diagnosed as IBS-D; minimal improvement with loperamide, lomotil, metronidazole and prednisone. P/w frequent malodorous soft stools with mucous, exacerbated by eating. Ddx includes IBS vs malabsorptive syndrome (celiac vs lactose intolerance vs pancreatic insufficiency vs SIBO) vs infectious colitis (WBC normal, afebrile, GI PCR negative). Less likely IBD (low suspicion - previous colonoscopies negative, ESR/CRP negative), chronic radiation proctitis although would not explain acute worsening of symptoms. Suspect patient may have component of functional disturbance 2/2 prior rectosigmoid anastomosis, ie low anterior resection syndrome (LARS).   - CT A/P with no acute findings, mod stool in colon   - GI PCR negative   - CBC, ESR/CRP, TSH all WNL  - c.diff negative    # hematochezia  Episode of hematochezia x1 after onset of frequent diarrhea, self-resolving and HDS with normal Hgb on presentation. Likely LGIB, suspect hemorrhoidal bleed ISO frequent BMs, ddx also includes diverticular bleed vs angioectasia ISO chronic radiation proctitis vs infectious colitis/gastroenteritis in context of acute diarrhea.     Recommendations:   > trend stool count  > f/u tTG IgA, total IgA to r/o celiac disease  > f/u stool pancreatic elastase, fecal fat  > recent outpatient colonoscopy 3/2023 with no abnormalities, repeat endoscopic eval not indicated at this time  > recommend bulking fiber supplement,  pt to eat smaller meals more frequently throughout the day  > can advance diet as tolerated  > no contraindication to discharge from GI standpoint   > please include information for motility specialist (Dr. Nathan Reaves) as patient may need anorectal manometry outpatient    * Incomplete until attested by GI attending *    Adrienne Escobar MD  Gastroenterology/Hepatology Fellow, PGY-4  Please contact via TEAMS    NON-URGENT CONSULTS:  Please email [email or web address]  [email or web address]

## 2024-01-24 NOTE — DISCHARGE NOTE PROVIDER - CARE PROVIDER_API CALL
Nathan Reaves  Gastroenterology  [address] [place] [id number]  Phone: [phone]  Fax: [phone]  Follow Up Time:

## 2024-01-25 LAB
ENDOMYSIUM IGA TITR SER IF: NEGATIVE — SIGNIFICANT CHANGE UP
ENDOMYSIUM IGA TITR SER: SIGNIFICANT CHANGE UP
LAB BILL ONLY ITEM: SIGNIFICANT CHANGE UP
TTG IGA SER-ACNC: <1.2 U/ML — SIGNIFICANT CHANGE UP
TTG IGA SER-ACNC: NEGATIVE — SIGNIFICANT CHANGE UP
TTG IGG SER IA-ACNC: NEGATIVE — SIGNIFICANT CHANGE UP
TTG IGG SER-ACNC: 2.5 U/ML — SIGNIFICANT CHANGE UP

## 2024-01-26 LAB
GLIADIN PEPTIDE IGA SER-ACNC: 9.7 UNITS — SIGNIFICANT CHANGE UP
GLIADIN PEPTIDE IGA SER-ACNC: NEGATIVE — SIGNIFICANT CHANGE UP
GLIADIN PEPTIDE IGG SER-ACNC: 14 UNITS — SIGNIFICANT CHANGE UP
GLIADIN PEPTIDE IGG SER-ACNC: NEGATIVE — SIGNIFICANT CHANGE UP